# Patient Record
Sex: OTHER/UNKNOWN | Race: WHITE | Employment: UNEMPLOYED | URBAN - METROPOLITAN AREA
[De-identification: names, ages, dates, MRNs, and addresses within clinical notes are randomized per-mention and may not be internally consistent; named-entity substitution may affect disease eponyms.]

---

## 2023-03-07 ENCOUNTER — LAB ENCOUNTER (OUTPATIENT)
Dept: LAB | Age: 58
End: 2023-03-07
Attending: INTERNAL MEDICINE
Payer: COMMERCIAL

## 2023-03-07 DIAGNOSIS — E78.00 PURE HYPERCHOLESTEROLEMIA: Primary | ICD-10-CM

## 2023-03-07 LAB
CHOLEST SERPL-MCNC: 158 MG/DL (ref ?–200)
FASTING PATIENT LIPID ANSWER: YES
HDLC SERPL-MCNC: 56 MG/DL (ref 40–59)
LDLC SERPL CALC-MCNC: 83 MG/DL (ref ?–100)
NONHDLC SERPL-MCNC: 102 MG/DL (ref ?–130)
TRIGL SERPL-MCNC: 102 MG/DL (ref 30–149)
VLDLC SERPL CALC-MCNC: 16 MG/DL (ref 0–30)

## 2023-03-07 PROCEDURE — 80061 LIPID PANEL: CPT

## 2023-03-07 PROCEDURE — 36415 COLL VENOUS BLD VENIPUNCTURE: CPT

## 2024-02-27 ENCOUNTER — LAB ENCOUNTER (OUTPATIENT)
Dept: LAB | Age: 59
End: 2024-02-27
Attending: INTERNAL MEDICINE
Payer: COMMERCIAL

## 2024-02-27 DIAGNOSIS — E78.00 HIGH CHOLESTEROL: Primary | ICD-10-CM

## 2024-02-27 LAB
CHOLEST SERPL-MCNC: 158 MG/DL (ref ?–200)
FASTING PATIENT LIPID ANSWER: YES
HDLC SERPL-MCNC: 45 MG/DL (ref 40–59)
LDLC SERPL CALC-MCNC: 92 MG/DL (ref ?–100)
NONHDLC SERPL-MCNC: 113 MG/DL (ref ?–130)
TRIGL SERPL-MCNC: 115 MG/DL (ref 30–149)
VLDLC SERPL CALC-MCNC: 19 MG/DL (ref 0–30)

## 2024-02-27 PROCEDURE — 80061 LIPID PANEL: CPT

## 2024-02-27 PROCEDURE — 36415 COLL VENOUS BLD VENIPUNCTURE: CPT

## 2025-05-08 ENCOUNTER — LAB ENCOUNTER (OUTPATIENT)
Dept: LAB | Age: 60
End: 2025-05-08
Attending: INTERNAL MEDICINE
Payer: COMMERCIAL

## 2025-05-08 DIAGNOSIS — R94.39 ABNORMAL BALLISTOCARDIOGRAM: Primary | ICD-10-CM

## 2025-05-08 DIAGNOSIS — E78.00 PURE HYPERCHOLESTEROLEMIA: ICD-10-CM

## 2025-05-08 DIAGNOSIS — Z95.5 STENTED CORONARY ARTERY: ICD-10-CM

## 2025-05-08 LAB
ANION GAP SERPL CALC-SCNC: 8 MMOL/L (ref 0–18)
BASOPHILS # BLD AUTO: 0.05 X10(3) UL (ref 0–0.2)
BASOPHILS NFR BLD AUTO: 1.2 %
BUN BLD-MCNC: 14 MG/DL (ref 9–23)
CALCIUM BLD-MCNC: 9.6 MG/DL (ref 8.7–10.6)
CHLORIDE SERPL-SCNC: 111 MMOL/L (ref 98–112)
CO2 SERPL-SCNC: 26 MMOL/L (ref 21–32)
CREAT BLD-MCNC: 1.17 MG/DL (ref 0.7–1.3)
EGFRCR SERPLBLD CKD-EPI 2021: 72 ML/MIN/1.73M2 (ref 60–?)
EOSINOPHIL # BLD AUTO: 0.07 X10(3) UL (ref 0–0.7)
EOSINOPHIL NFR BLD AUTO: 1.6 %
ERYTHROCYTE [DISTWIDTH] IN BLOOD BY AUTOMATED COUNT: 12.9 %
FASTING STATUS PATIENT QL REPORTED: YES
GLUCOSE BLD-MCNC: 80 MG/DL (ref 70–99)
HCT VFR BLD AUTO: 41.8 % (ref 39–53)
HGB BLD-MCNC: 13.9 G/DL (ref 13–17.5)
IMM GRANULOCYTES # BLD AUTO: 0.01 X10(3) UL (ref 0–1)
IMM GRANULOCYTES NFR BLD: 0.2 %
LYMPHOCYTES # BLD AUTO: 1.68 X10(3) UL (ref 1–4)
LYMPHOCYTES NFR BLD AUTO: 39.5 %
MCH RBC QN AUTO: 30.2 PG (ref 26–34)
MCHC RBC AUTO-ENTMCNC: 33.3 G/DL (ref 31–37)
MCV RBC AUTO: 90.7 FL (ref 80–100)
MONOCYTES # BLD AUTO: 0.49 X10(3) UL (ref 0.1–1)
MONOCYTES NFR BLD AUTO: 11.5 %
NEUTROPHILS # BLD AUTO: 1.95 X10 (3) UL (ref 1.5–7.7)
NEUTROPHILS # BLD AUTO: 1.95 X10(3) UL (ref 1.5–7.7)
NEUTROPHILS NFR BLD AUTO: 46 %
OSMOLALITY SERPL CALC.SUM OF ELEC: 299 MOSM/KG (ref 275–295)
PLATELET # BLD AUTO: 232 10(3)UL (ref 150–450)
POTASSIUM SERPL-SCNC: 4.5 MMOL/L (ref 3.5–5.1)
RBC # BLD AUTO: 4.61 X10(6)UL (ref 4.3–5.7)
SODIUM SERPL-SCNC: 145 MMOL/L (ref 136–145)
WBC # BLD AUTO: 4.3 X10(3) UL (ref 4–11)

## 2025-05-08 PROCEDURE — 36415 COLL VENOUS BLD VENIPUNCTURE: CPT

## 2025-05-08 PROCEDURE — 80048 BASIC METABOLIC PNL TOTAL CA: CPT

## 2025-05-08 PROCEDURE — 85025 COMPLETE CBC W/AUTO DIFF WBC: CPT

## 2025-05-13 VITALS — BODY MASS INDEX: 31.1 KG/M2 | WEIGHT: 210 LBS | HEIGHT: 69 IN

## 2025-05-13 RX ORDER — ASPIRIN 81 MG/1
81 TABLET ORAL DAILY
COMMUNITY

## 2025-05-13 RX ORDER — EZETIMIBE 10 MG/1
10 TABLET ORAL NIGHTLY
COMMUNITY

## 2025-05-13 RX ORDER — ATORVASTATIN CALCIUM 20 MG/1
20 TABLET, FILM COATED ORAL NIGHTLY
COMMUNITY

## 2025-05-13 NOTE — PAT NURSING NOTE
PreOp Instructions     You are scheduled for: a Cardiac Procedure     Date of Procedure: 05/15/25     Diet Instructions: Do not eat or drink anything after midnight including gum, mints, candy, etc.     Medications: Take Aspirin 81 mg x 4 tablets the morning of your procedure. Medications you are allowed to take can be taken with a sip of water the morning of your procedure.     Medications to Stop: Hold any herbal supplements and vitamins the morning of your procedure.     Skin Prep : Shower with antibacterial soap using a clean washcloth, prior to procedure. Once dried off, no lotions/powders/creams/ointments, etc., Do not shave the procedure area, this will be completed at the hospital during the preparation phase.     Arrival Time: The day prior to your procedure you will receive a phone call between 3:00 pm - 6:00 pm with your arrival time. If you haven't received a phone call, please check your voicemail messages., If you did not receive a voice mail and it is after 6:00 pm, please call the nursing supervisor at [phone].    Driving After Procedure: Sedation will be given so you WILL NOT be able to drive home. You will need a responsible adult  to drive you home. You can NOT take uber or taxi unless approved by your physician in advance.     Discharge Teaching: Your nurse will give you specific instructions before discharge, Most people can resume normal activities in 2-3 days, Any questions, please call the physician's office

## 2025-05-14 NOTE — H&P
Addendum    Nuclear imaging suggested LAD ischemia    Referred for diagnostic cath    Reviewed procedure - R/B/A - including no intervention - appears to have a good understanding    14/1.2

## 2025-05-15 ENCOUNTER — HOSPITAL ENCOUNTER (OUTPATIENT)
Dept: INTERVENTIONAL RADIOLOGY/VASCULAR | Facility: HOSPITAL | Age: 60
Discharge: HOME | End: 2025-05-15
Attending: INTERNAL MEDICINE
Payer: COMMERCIAL

## 2025-05-22 NOTE — PAT NURSING NOTE
PreOp Instructions     You are scheduled for: a Cardiac Procedure     Date of Procedure: 05/23/25     Diet Instructions: Do not eat or drink anything after midnight including gum, mints, candy, etc.     Medications: Take Aspirin 81 mg x 4 tablets the morning of your procedure, Medications you are allowed to take can be taken with a sip of water the morning of your procedure.     Medications to Stop: Do not take any herbal supplements and vitamins the morning of your procedure.     Skin Prep : Shower with antibacterial soap using a clean washcloth, prior to procedure. Once dried off, no lotions/powders/creams/ointments, etc., Do not shave the procedure area, this will be completed at the hospital during the preparation phase.     Arrival Time: You will receive a phone call this afternoon, between 3:00 pm - 6:00 pm with your arrival time. If you haven't received a phone call, please check your voicemail messages., If you did not receive a voice mail and it is after 6:00 pm, please call the nursing supervisor at [phone].    Driving After Procedure: Sedation will be given so you WILL NOT be able to drive home. You will need a responsible adult  to drive you home. You can NOT take uber or taxi unless approved by your physician in advance.     Discharge Teaching: Your nurse will give you specific instructions before discharge, Most people can resume normal activities in 2-3 days, Any questions, please call the physician's office

## 2025-05-23 ENCOUNTER — HOSPITAL ENCOUNTER (OUTPATIENT)
Dept: INTERVENTIONAL RADIOLOGY/VASCULAR | Facility: HOSPITAL | Age: 60
Discharge: HOME | End: 2025-05-23
Attending: INTERNAL MEDICINE | Admitting: INTERNAL MEDICINE
Payer: COMMERCIAL

## 2025-05-23 VITALS
HEART RATE: 58 BPM | DIASTOLIC BLOOD PRESSURE: 76 MMHG | TEMPERATURE: 98 F | RESPIRATION RATE: 15 BRPM | BODY MASS INDEX: 31 KG/M2 | OXYGEN SATURATION: 98 % | WEIGHT: 210 LBS | SYSTOLIC BLOOD PRESSURE: 108 MMHG

## 2025-05-23 DIAGNOSIS — R94.39 ABNORMAL STRESS TEST: ICD-10-CM

## 2025-05-23 DIAGNOSIS — I25.10 CAD (CORONARY ARTERY DISEASE): ICD-10-CM

## 2025-05-23 PROCEDURE — 99153 MOD SED SAME PHYS/QHP EA: CPT | Performed by: INTERNAL MEDICINE

## 2025-05-23 PROCEDURE — 93567 NJX CAR CTH SPRVLV AORTGRPHY: CPT | Performed by: INTERNAL MEDICINE

## 2025-05-23 PROCEDURE — 93458 L HRT ARTERY/VENTRICLE ANGIO: CPT | Performed by: INTERNAL MEDICINE

## 2025-05-23 PROCEDURE — 99152 MOD SED SAME PHYS/QHP 5/>YRS: CPT | Performed by: INTERNAL MEDICINE

## 2025-05-23 RX ORDER — ACETAMINOPHEN 325 MG/1
650 TABLET ORAL EVERY 4 HOURS PRN
Status: CANCELLED | OUTPATIENT
Start: 2025-05-23

## 2025-05-23 RX ORDER — TRAMADOL HYDROCHLORIDE 50 MG/1
100 TABLET ORAL EVERY 6 HOURS PRN
Status: CANCELLED | OUTPATIENT
Start: 2025-05-23

## 2025-05-23 RX ORDER — LIDOCAINE HYDROCHLORIDE 10 MG/ML
INJECTION, SOLUTION EPIDURAL; INFILTRATION; INTRACAUDAL; PERINEURAL
Status: COMPLETED
Start: 2025-05-23 | End: 2025-05-23

## 2025-05-23 RX ORDER — HEPARIN SODIUM 5000 [USP'U]/ML
INJECTION, SOLUTION INTRAVENOUS; SUBCUTANEOUS
Status: COMPLETED
Start: 2025-05-23 | End: 2025-05-23

## 2025-05-23 RX ORDER — TRAMADOL HYDROCHLORIDE 50 MG/1
50 TABLET ORAL EVERY 6 HOURS PRN
Status: CANCELLED | OUTPATIENT
Start: 2025-05-23

## 2025-05-23 RX ORDER — VERAPAMIL HYDROCHLORIDE 2.5 MG/ML
INJECTION INTRAVENOUS
Status: COMPLETED
Start: 2025-05-23 | End: 2025-05-23

## 2025-05-23 RX ORDER — NITROGLYCERIN 20 MG/100ML
INJECTION INTRAVENOUS
Status: COMPLETED
Start: 2025-05-23 | End: 2025-05-23

## 2025-05-23 RX ORDER — BIVALIRUDIN 250 MG/5ML
INJECTION, POWDER, LYOPHILIZED, FOR SOLUTION INTRAVENOUS
Status: DISCONTINUED
Start: 2025-05-23 | End: 2025-05-23 | Stop reason: WASHOUT

## 2025-05-23 RX ORDER — SODIUM CHLORIDE 9 MG/ML
INJECTION, SOLUTION INTRAVENOUS CONTINUOUS
Status: CANCELLED | OUTPATIENT
Start: 2025-05-23 | End: 2025-05-23

## 2025-05-23 RX ORDER — MIDAZOLAM HYDROCHLORIDE 1 MG/ML
INJECTION INTRAMUSCULAR; INTRAVENOUS
Status: COMPLETED
Start: 2025-05-23 | End: 2025-05-23

## 2025-05-23 RX ORDER — SODIUM CHLORIDE 9 MG/ML
INJECTION, SOLUTION INTRAVENOUS
Status: COMPLETED | OUTPATIENT
Start: 2025-05-24 | End: 2025-05-23

## 2025-05-23 RX ADMIN — SODIUM CHLORIDE: 9 INJECTION, SOLUTION INTRAVENOUS at 08:41:00

## 2025-05-23 NOTE — PROCEDURES
[facility]    PATIENT'S NAME: JUAN JOSE RAYGOZA   ATTENDING PHYSICIAN: Lamont Gandhi M.D.   OPERATING PHYSICIAN: Lamont Gandhi M.D.   PATIENT ACCOUNT#:   [id number]    LOCATION:  [address]  MEDICAL RECORD #:   [id number]       YOB: 1965  ADMISSION DATE:       05/23/2025      OPERATION DATE:  05/23/2025    CARDIAC PROCEDURE TRANSCRIPTION    CARDIAC CATHETERIZATION    PREOPERATIVE DIAGNOSIS:    POSTOPERATIVE DIAGNOSIS:    PROCEDURE PERFORMED:    1.   Left heart catheterization:  Left ventriculography and selective coronary arteriography.  2.   Supravalvular aortography.    HISTORY:  The patient is a 59-year-old gentleman with known coronary disease.  He is referred for diagnostic cardiac catheterization due to an abnormal nuclear study suggesting anterior wall ischemia.    DESCRIPTION OF PROCEDURE:  After obtaining informed consent, the patient was brought to the cardiac catheterization laboratory, and using ultrasound guidance and a micropuncture technique, a 5/6 Slender sheath was placed in the right radial artery.  I was able to place the radial wire easily into the ascending aorta and performed the initial right coronary angiography with a standard diagnostic catheter.  As I went to place the left coronary catheter, however, there was resistance to passage of the catheter beyond the mid upper arm.  Angiography revealed a very small and nearly obliterated vessel at that juncture.  I, therefore, had to switch to the femoral approach for the remainder of the case.  Access was achieved in the femoral using ultrasound guidance and a micropuncture technique.    HEMODYNAMICS:  The left ventricular end-diastolic pressure was 10 mmHg.    Using a dual-lumen pigtail catheter, mean gradient across the aortic valve was 20 mmHg.    LEFT VENTRICULOGRAPHY:  Ventriculography was performed in the 30-degree PARNELL projection.  There was a small focal area of inferoapical akinesia with overall  well-preserved left ventricular systolic function.  The estimated left ventricular ejection fraction was 60% to 65%.  No significant mitral insufficiency was noted.    Supravalvular aortography demonstrated calcification of the sinotubular junction.  There was mild generalized aneurysmal dilatation of the ascending aorta, and the patient had a clear-cut bicuspid valve with marked eccentricity of valve opening.  The leaflets were calcified, and there was restriction in overall valvular leaflet mobility.  Mild to moderate aortic insufficiency was present.    CORONARY ARTERIOGRAPHY:  The coronary circulation was left dominant, and a moderate amount of vascular calcification was seen fluoroscopically.    The left main coronary artery had no angiographic evidence of significant obstructive atherosclerosis.    The left anterior descending coronary artery was occluded in its midportion.  The distal vessel filled via left-to-left and right-to-left collaterals.    The left circumflex coronary artery was a large dominant vessel with no angiographic evidence of significant obstructive atherosclerosis.  There was mild angiographically nonobstructive atherosclerosis of the first major obtuse marginal vessel.    The right coronary artery had previously been stented.  This was a small nondominant vessel.  The stents were widely patent with only minimal intimal thickening in the midvessel.    SUMMARY:  In summary, the patient is a 59-year-old gentleman who on cardiac catheterization today was demonstrated to have an occluded LAD with preserved overall left ventricular systolic function.  He has a bicuspid aortic valve with mild aortic stenosis and mild to moderate aortic insufficiency.  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    At this point, the patient will be treated medically.  I will review his films with my interventional cardiology colleagues, and we will  make a decision regarding the possibility of approaching the LAD .  There is a very ambiguous proximal cap and it is a long occlusion.    I personally supervised the intravenous administration of conscious sedation throughout this case in the form of Versed and fentanyl.  Patient was under continuous hemodynamic, electrocardiographic, and respiratory monitoring.  Sedation time was 10:35 to 11:55.  This is a total of 80 minutes.    Dictated By Lamont Gandhi M.D.  d: 05/23/2025 12:23:49  t: 05/23/2025 13:03:35  Job [id number]  /

## 2025-05-23 NOTE — PROGRESS NOTES
Prelim angio    Normal LV function    Bicuspid aortic valve with marked eccentricity of valve opening    Mean gradient 20 mmHg    Mild AI    LM okay  LAD occluded mid with L-L and R-L collaterals  LCX okay - codominant  RCA previous stents patent    Radial artery small and nearly obliterated in upper arm - had to switch to femoral approach to complete case    Medical therapy for now    May consider PTCA of  in future

## 2025-05-23 NOTE — PROGRESS NOTES
Patient returned post angiogram. Right radial tr band in place right groin sealed with angioseal. Both access sights are soft ,dry and intact. VSS. Denies pain   Reviewed plan of care with patient and wife verbalized understanding. Mikaeli\ng without difficulty    Dr. Gandhi at bedside.     1300 air being removed from tr band.Continued to remove air from the tr band over the next hour.     1415 TR band removed and dressing placed. Right groin soft , dry and intact. Patient up to ambulate. Bilat sights soft, dry and intact.     Patient up to ambulate without difficulty VSS. IV discontinued AVS and discharge instructions given to wife and patient. Verbalized understanding. Patient discharged home via wheelchair.  .

## 2025-05-23 NOTE — DISCHARGE INSTRUCTIONS
HOME CARE INSTRUCTIONS FOLLOWING CORONARY ANGIOGRAPHY, PERIPHERAL ANGIOGRAPHY, ANGIOPLASTY (PTCA/PTA) OR INSERTION OF STENT IN THE CORONARY, CAROTID, AND/OR PERIPHERAL ARTERIES   Activity:    DO NOT drive after the procedure. You may resume driving late the following day according to the nurse or physician’s instructions    Plan on resting and relaxing tonight and tomorrow    Resume your normal activity after 48 hours, or as instructed by your physician    Do not lift anything over 10 pounds for the next 24 hours    Avoid sexual activity for the next 24 hours    Avoid drinking alcohol for the next 24 hours    If the groin site was used, avoid repeated stair use and excessive walking for the next 24 hours     What is Normal?    A small lump at the procedure site associated with mild tenderness when touched    The procedure site may be bruised or discolored    There may be a small amount of drainage on the bandage   Special Instructions:    Drink plenty of fluids during the next 24 hours to “flush” the contrast from your system    The bandage is to remain in place for 24 hours    Keep the bandage clean and dry    DO NOT submerge the procedure site for 72 hours (no bath tubs or pools)     After 24 hours, you must remove the bandage    You should shower after removing the bandage, and wash the procedure site gently with soap and water    If you choose to wear a bandage for a few days, make sure it remains clean and dry and that it is changed daily   When you should NOTIFY YOUR PHYSICIAN:    Bleeding can occur at the procedure site - both on the outside of the skin and/or beneath the surface of the skin    Swelling or a large lump at the procedure site can occur, which may be accompanied by moderate to severe pain   If either of the above occurs, lie down flat. Have someone apply pressure to the procedure site with both hands, as instructed by the nurse. Hold pressure for 20 minutes and the bleeding should stop. Notify  your physician of the occurrence   If the bleeding does not stop, call 911 and continue to apply pressure    If you experience signs of a fever, temperature >101o, chills, infection (redness, swelling, thick yellow drainage, or a foul odor from the procedure site)    If you notice any numbness, tingling, or loss of feeling to your leg or foot for groin access    If you notice any numbness, tingling, or loss of feeling to your fingers or hand, if wrist access was utilized   Continue the walking program initiated in the hospital and progress your walking as directed. Or, gradually resume your previous aerobic exercise schedule as tolerated.    Please dont make any major decisions for 24 hours after procedure.

## 2025-05-29 ENCOUNTER — HOSPITAL ENCOUNTER (INPATIENT)
Facility: HOSPITAL | Age: 60
LOS: 2 days | Discharge: HOME | DRG: 254 | End: 2025-05-31
Attending: HOSPITALIST | Admitting: HOSPITALIST
Payer: COMMERCIAL

## 2025-05-29 ENCOUNTER — APPOINTMENT (OUTPATIENT)
Dept: CT IMAGING | Facility: HOSPITAL | Age: 60
DRG: 254 | End: 2025-05-29
Attending: NURSE PRACTITIONER
Payer: COMMERCIAL

## 2025-05-29 ENCOUNTER — HOSPITAL ENCOUNTER (INPATIENT)
Facility: HOSPITAL | Age: 60
LOS: 2 days | Discharge: HOME | End: 2025-05-31
Attending: HOSPITALIST | Admitting: HOSPITALIST
Payer: COMMERCIAL

## 2025-05-29 ENCOUNTER — APPOINTMENT (OUTPATIENT)
Dept: CT IMAGING | Facility: HOSPITAL | Age: 60
End: 2025-05-29
Attending: NURSE PRACTITIONER
Payer: COMMERCIAL

## 2025-05-29 ENCOUNTER — ANESTHESIA EVENT (OUTPATIENT)
Dept: CARDIAC SURGERY | Facility: HOSPITAL | Age: 60
End: 2025-05-29
Payer: COMMERCIAL

## 2025-05-29 DIAGNOSIS — I72.4 FEMORAL ARTERY ANEURYSM, RIGHT: Primary | ICD-10-CM

## 2025-05-29 PROBLEM — I72.9 PSEUDOANEURYSM: Status: ACTIVE | Noted: 2025-05-29

## 2025-05-29 LAB
ANION GAP SERPL CALC-SCNC: 8 MMOL/L (ref 0–18)
ANTIBODY SCREEN: NEGATIVE
BASOPHILS # BLD AUTO: 0.04 X10(3) UL (ref 0–0.2)
BASOPHILS NFR BLD AUTO: 0.6 %
BUN BLD-MCNC: 14 MG/DL (ref 9–23)
CALCIUM BLD-MCNC: 9.8 MG/DL (ref 8.7–10.6)
CHLORIDE SERPL-SCNC: 105 MMOL/L (ref 98–112)
CO2 SERPL-SCNC: 26 MMOL/L (ref 21–32)
CREAT BLD-MCNC: 1.07 MG/DL (ref 0.7–1.3)
EGFRCR SERPLBLD CKD-EPI 2021: 80 ML/MIN/1.73M2 (ref 60–?)
EOSINOPHIL # BLD AUTO: 0.12 X10(3) UL (ref 0–0.7)
EOSINOPHIL NFR BLD AUTO: 1.9 %
ERYTHROCYTE [DISTWIDTH] IN BLOOD BY AUTOMATED COUNT: 12.7 %
GLUCOSE BLD-MCNC: 93 MG/DL (ref 70–99)
HCT VFR BLD AUTO: 37.5 % (ref 39–53)
HGB BLD-MCNC: 12.8 G/DL (ref 13–17.5)
IMM GRANULOCYTES # BLD AUTO: 0.02 X10(3) UL (ref 0–1)
IMM GRANULOCYTES NFR BLD: 0.3 %
LYMPHOCYTES # BLD AUTO: 1.83 X10(3) UL (ref 1–4)
LYMPHOCYTES NFR BLD AUTO: 28.9 %
MCH RBC QN AUTO: 30.3 PG (ref 26–34)
MCHC RBC AUTO-ENTMCNC: 34.1 G/DL (ref 31–37)
MCV RBC AUTO: 88.9 FL (ref 80–100)
MONOCYTES # BLD AUTO: 0.6 X10(3) UL (ref 0.1–1)
MONOCYTES NFR BLD AUTO: 9.5 %
NEUTROPHILS # BLD AUTO: 3.72 X10 (3) UL (ref 1.5–7.7)
NEUTROPHILS # BLD AUTO: 3.72 X10(3) UL (ref 1.5–7.7)
NEUTROPHILS NFR BLD AUTO: 58.8 %
OSMOLALITY SERPL CALC.SUM OF ELEC: 288 MOSM/KG (ref 275–295)
PLATELET # BLD AUTO: 244 10(3)UL (ref 150–450)
POTASSIUM SERPL-SCNC: 4 MMOL/L (ref 3.5–5.1)
RBC # BLD AUTO: 4.22 X10(6)UL (ref 4.3–5.7)
RH BLOOD TYPE: NEGATIVE
SODIUM SERPL-SCNC: 139 MMOL/L (ref 136–145)
WBC # BLD AUTO: 6.3 X10(3) UL (ref 4–11)

## 2025-05-29 PROCEDURE — 99223 1ST HOSP IP/OBS HIGH 75: CPT | Performed by: HOSPITALIST

## 2025-05-29 PROCEDURE — 75635 CT ANGIO ABDOMINAL ARTERIES: CPT | Performed by: NURSE PRACTITIONER

## 2025-05-29 RX ORDER — POLYETHYLENE GLYCOL 3350 17 G/17G
17 POWDER, FOR SOLUTION ORAL DAILY PRN
Status: DISCONTINUED | OUTPATIENT
Start: 2025-05-29 | End: 2025-05-31

## 2025-05-29 RX ORDER — ONDANSETRON 2 MG/ML
4 INJECTION INTRAMUSCULAR; INTRAVENOUS EVERY 6 HOURS PRN
Status: DISCONTINUED | OUTPATIENT
Start: 2025-05-29 | End: 2025-05-30

## 2025-05-29 RX ORDER — ACETAMINOPHEN 500 MG
500 TABLET ORAL EVERY 4 HOURS PRN
Status: DISCONTINUED | OUTPATIENT
Start: 2025-05-29 | End: 2025-05-31

## 2025-05-29 RX ORDER — SODIUM PHOSPHATE, DIBASIC AND SODIUM PHOSPHATE, MONOBASIC 7; 19 G/230ML; G/230ML
1 ENEMA RECTAL ONCE AS NEEDED
Status: DISCONTINUED | OUTPATIENT
Start: 2025-05-29 | End: 2025-05-31

## 2025-05-29 RX ORDER — ATORVASTATIN CALCIUM 20 MG/1
20 TABLET, FILM COATED ORAL NIGHTLY
Status: DISCONTINUED | OUTPATIENT
Start: 2025-05-29 | End: 2025-05-30

## 2025-05-29 RX ORDER — SENNOSIDES 8.6 MG
17.2 TABLET ORAL NIGHTLY PRN
Status: DISCONTINUED | OUTPATIENT
Start: 2025-05-29 | End: 2025-05-31

## 2025-05-29 RX ORDER — PROCHLORPERAZINE EDISYLATE 5 MG/ML
5 INJECTION INTRAMUSCULAR; INTRAVENOUS EVERY 8 HOURS PRN
Status: DISCONTINUED | OUTPATIENT
Start: 2025-05-29 | End: 2025-05-31

## 2025-05-29 RX ORDER — EZETIMIBE 10 MG/1
10 TABLET ORAL NIGHTLY
Status: DISCONTINUED | OUTPATIENT
Start: 2025-05-30 | End: 2025-05-31

## 2025-05-29 RX ORDER — BISACODYL 10 MG
10 SUPPOSITORY, RECTAL RECTAL
Status: DISCONTINUED | OUTPATIENT
Start: 2025-05-29 | End: 2025-05-31

## 2025-05-29 NOTE — HISTORICAL OFFICE NOTE
Facility Logo [facility]  [address] [place] [address], [address] [place] [address]  [phone]    Document Amended  Luigi Mckinney  Progress Note  Demographics:  Name: Luigi Mckinney YOB: 1965  Age: 59, Male Medical Record No: [id number]  Visited Date/Time: 05/28/2025 02:30 PM    Chief Complaints  LHC - 5/23   History of Present Illness  Mr. Mckinney is a pleasant 59 year old male with history of CAD/MI s/p PCI to RCA, Bicuspid AV with mild to moderate AI, HL who presents for hospital follow up.    Patient underwent LHC on 5/23/25 with Dr. Gandhi for abnormal stress test. LHC demonstrated occluded mid LAD and patent stent in the RCA. Bicuspid AC with mild AS and mild to moderate AI.    He remains active. No chest pain, shortness of breath, PND, orthopnea, or edema. No palpitations, dizziness, presyncope, or syncope. He has noted tenderness at procedure site and swelling.    He has been taking TRT gel for low testosterone levels with notable difference in energy.    Labs 4/18/25:     PET MPI 4/18/25:   This is an abnormal perfusion study.    Medium sized reversible anteroapical perfusion defect of moderate severity.   The left ventricular cavity is noted to be mildly enlarged on the stress studies. The stress left ventricular ejection fraction was calculated to be 64%. There is stress-induced anteroapical hypokinesia. The rest left ventricular cavity is noted to be normal. The rest left ventricular ejection fraction was calculated to be 59% and rest left ventricular global function is normal.     Echo 2/11/25:  1. The left ventricle is normal in size and global systolic function. Left ventricular diastolic function is normal. Left ventricular ejection fraction is 60-65%.  2. The aortic valve appears functionally bicuspid with mild aortic stenosis. The trans-aortic peak velocity is 2.6 m/s. The trans-aortic mean gradient is 15.0 mmHg. Trace AI.  3. Aortic root diameter is 4.1 cm.  Ascending  aorta diameter is 4.0 cm.  Cardiac risk factors Dyslipidemia and Never smoked  Past Medical History  1.Hematoma of right inguinal region  2.Ascending aorta dilatation  3.High cholesterol  4.s/p drug-eluting stent in the coronary bypass graft - DESC  Family History  1. Mother - High blood cholesterol  Social History  Smoking status Never smoked  Tobacco usage - No (Non-smoker for personal reasons (finding))  Alcohol usage - Yes (casually)  Review of systems  Cardiovascular Edema  No history of Chest pain, AQUINO, Palpitations, Syncope, PND, Orthopnea and Claudication  Respiratory No history of SOB  Physical Examination  Vitals Right Arm Sitting  / 68 mmHg, Pulse rate 69 bpm, Regular, Height in 5' 9\", BMI: 30.3, Weight in 205 lbs (or) 92.99 kgs and BSA : 2.15 cc/m²  General Appearance No Acute Distress  Neck No carotid bruits and No JVD  Respiratory Unlabored and Lungs clear with normal breath sounds  Cardiovascular 2/6 diastolic murmur and Regular rhythm. Normal rate present.    Lower Extremities No edema  Skin RFA site is C/D/I and no bruit. Moderate sized hematoma.  Allergies  No known medication allergies.  Medications (Info obtained by: Verbal)  1.aspirin 81 mg chewable tablet, Take 1 tablet orally once a day.  2.atorvastatin 20 mg tablet, Take 1 tablet orally once a day.  3.Zetia 10 mg tablet, Take 1 tablet orally once a day.  Impression  1.Hematoma of right inguinal region  2.Abnormal stress test  3.Ascending aorta dilatation  4.Aortic valve, bicuspid  5.Hx of heart artery stent  6.NSTEMI - non-ST elevated myocardial infarction, acute, non-Q wave or nontransmural  7.High cholesterol  Assessment & Plan  Assessment:  1. CAD/MI s/p PCI to RCA: Most recent C with occluded mid LAD and patent RCA. Patient denies chest pain or shortness of breath. We discussed aggressive medical management. Will increase atorvastatin to 40 mg qhs and continue Zetia 10 mg daily. If he does not tolerate higher intensity statin,  would recommend PCSK9 inhibitor. Repeat fasting labs in 2 months. He has a moderate sized hematoma with tenderness. Will plan for RLE arterial dopplers to rule out PSA today. Will review with Dr. Gandhi.  2. Bicuspid AV with mild AS and mild to moderate AI: Stable. Continue to monitor.      Plan:  1. RLE arterial dopplers to rule out pseudoaneurysm today  2. Increase Atorvastatin 40 mg qhs  3. Fasting labs in 2 months: FLP, AST, ALT, CK  5. Follow up with Dr. Gandhi as scheduled on 25      Increased BMI: Provide patient with information regarding diet and lifestyle changes.  Medications Ordered  1.atorvastatin 40 mg tablet, Take 1 tablet orally once a day at night.  Labs and Diagnostics ordered  1.Arterial Lower Ultrasound Ext (Right) ([facility]) (Today)  2.Lipid Panel_Fasting (2 Months)  3.AST / ALT (2 Months)  4.CK (Creatine kinase) (2 Months)  Miscellaneous  1.Weight monitoring (regime/therapy)  Nurses documentation  Upcoming surgeries: none  Use of assistive devices(s): none  Refills completed: none  EKG: No   Triage & medication list reviewed by: DANIEL     Patient instructions  Medications:   1. Increase Atorvastatin 40 mg every night at bedtime   2.Continue current medications.    Testin. Right Lower Extremity dopplers, today  2.  Fasting labs in 2 months: FLP, AST, ALT, CK    Provider Follow Up:  1. Follow up with Dr. Gandhi as scheduled on 25    Please bring in you medication bottles or updated medicine list to your next appointment.  Call [facility] if you have any problems or concerns at [phone]      Lab Details  BASIC METABOLIC PANEL (8)  2025 06:39:34 PM  GLUCOSE 80 70-99 mg/dL  F  SODIUM 145 136-145 mmol/L  F  POTASSIUM 4.5 3.5-5.1 mmol/L  F  CHLORIDE 111  mmol/L  F  CO2 26.0 21.0-32.0 mmol/L  F  ANION GAP 8 0-18 mmol/L  F  BUN 14 9-23 mg/dL  F  CREATININE 1.17 0.70-1.30 mg/dL  F  CALCIUM 9.6 8.7-10.6 mg/dL  F  OSMOLALITY CALCULATED 299 275-295 mOsm/kg H F  E GFR CR 72 >=60  mL/min/1.73m2  F  FASTING PATIENT BMP ANSWER Yes   F  CBC WITH DIFFERENTIAL WITH PLATELET  05/08/2025 02:44:15 PM  WBC 4.3 4.0-11.0 x10(3) uL  F  RED BLOOD COUNT 4.61 4.30-5.70 x10(6)uL  F  HGB 13.9 13.0-17.5 g/dL  F  HCT 41.8 39.0-53.0 %  F  PLATELETS 232.0 150.0-450.0 10(3)uL  F  MEAN CELL VOLUME 90.7 80.0-100.0 fL  F  MEAN CORPUSCULAR HEMOGLOBIN 30.2 26.0-34.0 pg  F  MEAN CORPUSCULAR HGB CONC 33.3 31.0-37.0 g/dL  F  RED CELL DISTRIBUTION WIDTH CV 12.9 %  F  NEUTROPHIL ABS PRELIM 1.95 1.50-7.70 x10 (3) uL  F  NEUTROPHIL ABSOLUTE 1.95 1.50-7.70 x10(3) uL  F  LYMPHOCYTE ABSOLUTE 1.68 1.00-4.00 x10(3) uL  F  MONOCYTE ABSOLUTE 0.49 0.10-1.00 x10(3) uL  F  EOSINOPHIL ABSOLUTE 0.07 0.00-0.70 x10(3) uL  F  BASOPHIL ABSOLUTE 0.05 0.00-0.20 x10(3) uL  F  IMMATURE GRANULOCYTE COUNT 0.01 0.00-1.00 x10(3) uL  F  NEUTROPHIL % 46.0 %  F  LYMPHOCYTE % 39.5 %  F  MONOCYTE % 11.5 %  F  EOSINOPHIL % 1.6 %  F  BASOPHIL % 1.2 %  F  IMMATURE GRANULOCYTE RATIO % 0.2 %  F  LIPID PANEL  04/18/2025 08:52:00 AM  CHOLESTEROL 167 0 - 199 MG/DL  F  TRIGLYCERIDES 112 10 - 250 MG/DL  F  HDL CHOLESTEROL 53 30 - 85 MG/DL  F  VLDL 22 5 - 40 MG/DL  F  TOTAL CHOL/HDL 3.2 0.0 - 5.0 MG/DL  F  Non- 0 - 129 mg/dL  F  LDL CHOLESTEROL DIRECT 108 0 - 130 MG/DL  F  Diagnostics Details  Lower Extremity Arterial Ultrasound 05/28/2025  1.The study quality is good.    2.History of cardiac catherization, 5/23/2025 with right groin access.    3.Pseudoaneurysm visualized in the right groin measuring 2.5 x 2.2 x 2.4 cm with a neck connection measuring 0.57 cm to the distal right common femoral artery.    4.No evidence of AV fistula in the right groin.    5.No evidence of deep venous thrombosis of the right distal external iliac, common femoral, profunda femoral and proximal superficial femoral veins with normal venous blood flow patterns.    6.Dr. Fang notified.    Nuclear PET 04/18/2025  1.Stress EKG is normal.    1.This is an abnormal perfusion  study.    2.Medium sized reversible anteroapical perfusion defect of moderate severity.    3.The left ventricular cavity is noted to be mildly enlarged on the stress studies. The stress left ventricular ejection fraction was calculated to be 64%. There is stress-induced anteroapical hypokinesia. The rest left ventricular cavity is noted to be normal. The rest left ventricular ejection fraction was calculated to be 59% and rest left ventricular global function is normal.    Trans Thoracic Echocardiogram 02/11/2025  1.The left ventricle is normal in size and global systolic function. Left ventricular diastolic function is normal. Left ventricular ejection fraction is 60-65%.    2.The aortic valve appears functionally bicuspid with mild aortic stenosis. The trans-aortic peak velocity is 2.6 m/s. The trans-aortic mean gradient is 15.0 mmHg. Trace AI.    3.Aortic root diameter is 4.1 cm. Ascending aorta diameter is 4.0 cm.    Exercise Treadmill Testing 12/07/2022  1.Stress EKG is normal.    CPOE Orders carried out by: Cassandra RAMOS and Bushra Martin  Care Providers: Josh Fang MD, Jaycee Fuentes CMA, Cassandra RAMOS and Bushra Martin  Electronically Authenticated by  Cassandra RAMOS  05/28/2025 11:57:51 PM  Amendment Request : 1  Discussed with Dr. Fang and Dr. Gandhi. RLE arterial dopplers shows 2.5 cm PSA. Patient was seen by Dr. Gandhi. IR consultation recommended for thrombin injection. IR notified at [facility] however requesting CD to review US from [facility]. Will need to send tomorrow morning as medical records is not available today.  Reviewed plan of care with patient and Dr. Gandhi. Recommend IR consultation and possible thrombin injection tomorrow. IR holding 1pm time frame. Patient advised to go to the ED for expedited evaluation if worsening symptoms including increasing swelling or pain. Patient verbalized understanding.    Requested By Initiated By Accepted By  Cassandra Trujillo  Lacie RAMOS  05/29/2025 12:05:24 AM 05/29/2025 12:05:24 AM 05/29/2025 12:05:24 AM  Amendment Request : 2  Reviewed with IR, not suitable for thrombin injection. D/w Dr. Fang and Dr. Ashley, advised ED admission and possible surgery tomorrow. Left message for patient to contact our office.  Requested By Initiated By Accepted By  Cassandra RAMOS  05/29/2025 09:00:04 AM 05/29/2025 09:00:04 AM 05/29/2025 09:00:04 AM  Amendment Request : 3  Attempted to reach patient x 2. No answer. LMTCB.  Requested By Initiated By Accepted By  Cassandra RAMOS  05/29/2025 11:30:39 AM 05/29/2025 11:30:39 AM 05/29/2025 11:30:39 AM  Amendment Request : 4  Discussed with patient that pseudoaneursym is not suitable for thrombin injection. Surgery consult recommended and possible surgical repair tomorrow but Dr. Ashley would like CTA abd/pelvis to further evaluate inpatient. Patient denies pain or increase in swelling. We will work on direct admission or if no beds available, will need to go to the ED.   Reviewed with Dr. Gandhi who agrees. Will plan for direct admission for pseudoaneurysm and consult Dr. Ashley and hospitalist team. Inpatient ANUJA made aware. Chery calling bed board for availability.  Requested By Initiated By Accepted By  Cassandra RAMOS  05/29/2025 11:58:34 AM 05/29/2025 11:58:34 AM 05/29/2025 11:58:34 AM  Disclaimer: Components of this note were documented using voice recognition system and are subject to errors not corrected at proofreading. Contact the author of this note for any clarifications.

## 2025-05-29 NOTE — ANESTHESIA PREPROCEDURE EVALUATION
PRE-OP EVALUATION    Patient Name: Luigi Mckinney    Admit Diagnosis: Pseudoaneruysm    Pre-op Diagnosis: right femoral pseudoaneurysm    RIGHT FEMORAL ARTERY PSEUDOANEURYSM REPAIR    Anesthesia Procedure: RIGHT FEMORAL ARTERY PSEUDOANEURYSM REPAIR (Right)    Surgeons and Role:     * Varinder Ashley MD - Primary    Pre-op vitals reviewed.        There is no height or weight on file to calculate BMI.    Current medications reviewed.  Hospital Medications:  Current Medications[1]    Outpatient Medications:   Prescriptions Prior to Admission[2]    Allergies: Patient has no known allergies.      Anesthesia Evaluation    Patient summary reviewed.    Anesthetic Complications  (-) history of anesthetic complications         GI/Hepatic/Renal                                 Cardiovascular                     (+) hyperlipidemia  (+) CAD  (+) past MI  (+) CABG/stent    (+) valvular problems/murmurs and AI                       Endo/Other                                  Pulmonary                           Neuro/Psych                              CAD/MI s/p PCI to RCA, Bicuspid AV with mild to moderate AI    He has a moderate sized hematoma with tenderness        Past Surgical History[3]  Social Hx on file[4]  History   Drug Use Unknown     Available pre-op labs reviewed.  Lab Results   Component Value Date    WBC 4.3 05/08/2025    RBC 4.61 05/08/2025    HGB 13.9 05/08/2025    HCT 41.8 05/08/2025    MCV 90.7 05/08/2025    MCH 30.2 05/08/2025    MCHC 33.3 05/08/2025    RDW 12.9 05/08/2025    .0 05/08/2025     Lab Results   Component Value Date     05/08/2025    K 4.5 05/08/2025     05/08/2025    CO2 26.0 05/08/2025    BUN 14 05/08/2025    CREATSERUM 1.17 05/08/2025    GLU 80 05/08/2025    CA 9.6 05/08/2025             ASA: 3   Plan: general           Comment: This is a limited chart review prior to patient encounter in anticipation of anesthesia for a scheduled procedure.                    Present on  Admission:  **None**             [1]   No current facility-administered medications on file as of 5/29/2025.   [2]   Medications Prior to Admission   Medication Sig Dispense Refill Last Dose/Taking    aspirin 81 MG Oral Tab EC Take 1 tablet (81 mg total) by mouth daily.       atorvastatin 20 MG Oral Tab Take 1 tablet (20 mg total) by mouth nightly.       ezetimibe 10 MG Oral Tab Take 1 tablet (10 mg total) by mouth nightly.      [3]   Past Surgical History:  Procedure Laterality Date    Cath drug eluting stent      Cath percutaneous  transluminal coronary angioplasty     [4]   Social History  Socioeconomic History    Marital status:    Tobacco Use    Smoking status: Never    Smokeless tobacco: Never   Substance and Sexual Activity    Alcohol use: Yes     Comment: Socially    Drug use: Never

## 2025-05-29 NOTE — CONSULTS
[facility]  Consultation    Luigi Mckinney Patient Status:  Inpatient    1965 MRN [id number]   Location [facility] 7NE-A Attending Emory Dick MD   Hosp Day # 0 PCP SHARMAINE TOWNSEND     Consults    Reason for Consultation:  Right groin pseudoaneurysm    History of Present Illness:  Luigi Mckinney is a a(n) 59 year old male who had routine follow up following [facility] yesterday in [facility] office. Was found to have tenderness/swelling to right groin and so US was done. US demonstrated pseudoaneurysm. Plan was for thrombin injection, however, after discussion with IR, this was unable to be done. Therefore, the PA discussed with Dr. Ashley who recommend admission and surgical repair.     Patient is currently resting in bed, in no acute distress. Denies cp, sob. Has right groin tenderness and ecchymosis.    History:  Past Medical History[1]  Past Surgical History[2]  Family History[3]   reports that he has never smoked. He has never used smokeless tobacco. He reports current alcohol use. He reports that he does not use drugs.    Allergies:  Allergies[4]    Medications:  Current Hospital Medications[5]    Review of Systems:  Review of Systems   Skin:  Positive for color change.   All other systems reviewed and are negative.          OBJECTIVE  There were no vitals taken for this visit.  No data recorded.    Wt Readings from Last 3 Encounters:   25 210 lb (95.3 kg)   25 210 lb (95.3 kg)       Telemetry: nsr    Physical Exam:      Gen: alert, oriented x 3, NAD  Heent: pupils equal, reactive. Mucous membranes moist.   Neck: no jvd  Cardiac: regular rate and rhythm, normal S1,S2  Lungs: CTA  Abd: soft, NT/ND +bs  Ext: no edema. Right groin with ecchymosis, full, small palpable hematoma noted. Dp2+ bilaterally.  Skin: Warm, dry  Neuro: No focal deficits        Diagnostics History:    Echo: 25     1. The left ventricle is normal in size and global systolic function. Left ventricular diastolic  function is normal. Left ventricular ejection fraction is 60-65%.  2. The aortic valve appears functionally bicuspid with mild aortic stenosis. The trans-aortic peak velocity is 2.6 m/s. The trans-aortic mean gradient is 15.0 mmHg. Trace AI.  3. Aortic root diameter is 4.1 cm.  Ascending aorta diameter is 4.0 cm.    Cath: 5/23/25  SUMMARY:  In summary, the patient is a 59-year-old gentleman who on cardiac catheterization today was demonstrated to have an occluded LAD with preserved overall left ventricular systolic function.  He has a bicuspid aortic valve with mild aortic stenosis and mild to moderate aortic insufficiency.  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     At this point, the patient will be treated medically.  I will review his films with my interventional cardiology colleagues, and we will make a decision regarding the possibility of approaching the LAD .  There is a very ambiguous proximal cap and it is a long occlusion.            Results:   No results for input(s): \"GLU\", \"BUN\", \"CREATSERUM\", \"GFRAA\", \"GFRNAA\", \"EGFRCR\", \"CA\", \"NA\", \"K\", \"CL\", \"CO2\" in the last 168 hours.  No results for input(s): \"RBC\", \"HGB\", \"HCT\", \"MCV\", \"MCH\", \"MCHC\", \"RDW\", \"NEPRELIM\", \"WBC\", \"PLT\" in the last 168 hours.      No results for input(s): \"BNP\" in the last 168 hours.  No results found for: \"PT\", \"INR\"  No results found for: \"TROP\"      No results found.     [facility] 5/28/25     1. The study quality is good.   2. History of cardiac catherization, 5/23/2025 with right groin access.  3. Pseudoaneurysm visualized in the right groin measuring 2.5 x 2.2 x 2.4 cm with a neck connection measuring 0.57 cm to the distal right common femoral  artery.  4. No evidence of AV fistula in the right   groin.  5. No evidence of deep venous thrombosis of the  right distal external iliac, common femoral, profunda femoral and proximal superficial femoral veins with  normal venous blood flow patterns.  6. Dr. Fang notified.      Assessment:  Right groin pseudoaneurysm  US above. Dr. Ashley discussed with OP PA and IR unable to do thrombin injection, thus direct admitted for surgical repair.   CAD s/p [facility] 5/23  LAD occluded mid with L-L and R-L collaterals, Lcx ok, RCA previous stents patent  Plan for medical therapy vs future   Had abnormal Cardiac PET leading to diagnostic [facility]  CAD with prior MI/stenting to RCA  Bicuspid AV with mild-mod AI  LVEF 60-65%  HL-on statin, zetia  HTN    Plan:  Labs stable.   Dr. Ashley to see. CTA abd/pelvis, LE ordered   Cont home cardiac regimen.         Bridgette RAJAT Eduardo, APRN  5/29/2025  2:31 PM  [phone]      Seen and examined last evening in office and seen again this evening to review situation.    Has developed a right CFA pseudoaneurysm post cath despite ultrasound guidance, a micropuncture technique and post procedure angioseal with prolonged manual hold by myself for extra protection. Unfortunately IR felt the neck was too wide for an attempt at thrombin injection and/or ultrasound closure and he will need local surgical repair (Dr. Ashley)    Reviewed anatomy and plan for repair in detail with Luigi.    Known CAD yet clinically stable with good LV function. Plan for ongoing medical therapy.    Acceptable cardiac risk to proceed with PV surgery tomorrow as planned - hopefully home Saturday.    Follow up will be with Dr. Ashley and myself in the office        H/P 3       [1]   Past Medical History:   Coronary atherosclerosis    Heart attack (HCC)    High cholesterol   [2]   Past Surgical History:  Procedure Laterality Date    Cath drug eluting stent      Cath percutaneous  transluminal coronary angioplasty     [3] No family history on file.  [4] No Known Allergies  [5] No current facility-administered medications for this encounter.

## 2025-05-29 NOTE — TRANSFER CENTER NOTE
DIRECT ADMISSION    Admitting MD: Dr. Dick  Called in by: Chery SELLERS at [facility] of Dr. Lamont Gandhi office  Call back #: RN cell phone # was provided  Date of admission: 5/29/25  Diagnosis: Pseudoaneurysm not able to be done o/p via thrombin injection will require sx intervention Dr. Ashley to be on consult.   Specialist MD:  Dr. Ashley  Hospitalist assigned: Dr. Dick  Type of admission: INPT  Type of bed: Cardiac Telemetry  Time of admission: 1430 (estimation)  Insurance Verification complete: Yes

## 2025-05-29 NOTE — H&P
[facility]     Luigi Mckinney Patient Status:  Inpatient    1965 MRN [id number]   [facility] 7NE-A Attending Emory Dick MD   Hosp Day # 0 PCP SHARMAINE TOWNSEND     Chief Complaint: Right groin swelling/bruising     Subjective:    History of Present Illness:     Luigi Mckinney is a 59 year old male with a PMH of CAD with prior PCI and recent diagnostic LHC  was directly admitted to hospital at request of cardiology due to right groin pseudoaneurysm found on ultrasound . Patient reports increased swelling and bruising of right groin since procedure . Denies pain. Denies CP/SOB/N/V.     History/Other:    Past Medical History:  Past Medical History[1]  Past Surgical History:   Past Surgical History[2]   Family History:   Family History[3]  Social History:    reports that he has never smoked. He has never used smokeless tobacco. He reports current alcohol use. He reports that he does not use drugs.     Allergies: Allergies[4]    Medications:  Medications Ordered Prior to Encounter[5]    Review of Systems:   A comprehensive review of systems was completed.    Pertinent positives and negatives noted in the HPI.    Objective:   Physical Exam:    BP (!) 142/91   Pulse 65   Resp 17   Wt 205 lb (93 kg)   SpO2 95%   BMI 30.27 kg/m²   General: No acute distress, Alert  Respiratory: No rhonchi, no wheezes  Cardiovascular: S1, S2. Regular rate and rhythm  Abdomen: Soft, Non-tender, non-distended, positive bowel sounds  Neuro: No new focal deficits  Extremities: Right groin edema with ecchymosis.     Results:    Labs:      Labs Last 24 Hours:    No results for input(s): \"RBC\", \"HGB\", \"HCT\", \"MCV\", \"MCH\", \"MCHC\", \"RDW\", \"NEPRELIM\", \"WBC\", \"PLT\" in the last 168 hours.    No results for input(s): \"GLU\", \"BUN\", \"CREATSERUM\", \"GFRAA\", \"GFRNAA\", \"EGFRCR\", \"CA\", \"ALB\", \"NA\", \"K\", \"CL\", \"CO2\", \"ALKPHO\", \"AST\", \"ALT\", \"BILT\", \"TP\" in the last 168 hours.    eGFR cannot  be calculated (Patient's most recent lab result is older than the maximum 7 days allowed.).    No results found for: \"PT\", \"INR\"    No results for input(s): \"TROP\", \"TROPHS\", \"CK\" in the last 168 hours.    No results for input(s): \"TROP\", \"PBNP\" in the last 168 hours.    No results for input(s): \"PCT\" in the last 168 hours.    Imaging: Imaging data reviewed in Epic.    Assessment & Plan:      #Right femoral artery pseudoaneurysm in setting of recent cardiac cath   -CTA A/P pending  -Vascular surgery and cardiology on consult    #CAD with prior PCI  #Recent abnormal stress test  -[facility] report 5/23 reviewed - no intervention performed at that time  -Continue statin  -Resume ASA when ok with vascular/cardiology     #DL  -Statin/zetia     Plan of care discussed with patient and ED physician.     Emery Treviño DO    Supplementary Documentation:     The 21st Century Cures Act makes medical notes like these available to patients in the interest of transparency. Please be advised this is a medical document. Medical documents are intended to carry relevant information, facts as evident, and the clinical opinion of the practitioner. The medical note is intended as peer to peer communication and may appear blunt or direct. It is written in medical language and may contain abbreviations or verbiage that are unfamiliar.                                       [1]   Past Medical History:   Coronary atherosclerosis    Heart attack (HCC)    High cholesterol   [2]   Past Surgical History:  Procedure Laterality Date    Cath drug eluting stent      Cath percutaneous  transluminal coronary angioplasty     [3] No family history on file.  [4] No Known Allergies  [5]   Current Facility-Administered Medications on File Prior to Encounter   Medication Dose Route Frequency Provider Last Rate Last Admin    [COMPLETED] sodium chloride 0.9% infusion   Intravenous On Call Lamont Gandhi MD 20 mL/hr at 05/23/25 0841 New Bag at 05/23/25 0841     [COMPLETED] lidocaine PF (Xylocaine-MPF) 1 % injection             [COMPLETED] verapamil (Isoptin) 2.5 mg/mL injection             [COMPLETED] heparin (Porcine) 5000 UNIT/ML injection             [COMPLETED] Nitroglycerin in D5W 200-5 MCG/ML-% injection             [COMPLETED] fentaNYL (Sublimaze) 50 mcg/mL injection             [COMPLETED] midazolam (Versed) 2 MG/2ML injection             [COMPLETED] heparin (Porcine) 5000 UNIT/ML injection             [COMPLETED] iopamidol 76% (ISOVUE-370) injection for power injector  200 mL Injection ONCE PRN Lamont Gandhi MD   170 mL at 05/23/25 1124    [COMPLETED] heparin (Porcine) 5000 UNIT/ML injection             [COMPLETED] fentaNYL (Sublimaze) 50 mcg/mL injection             [COMPLETED] midazolam (Versed) 2 MG/2ML injection              Current Outpatient Medications on File Prior to Encounter   Medication Sig Dispense Refill    aspirin 81 MG Oral Tab EC Take 1 tablet (81 mg total) by mouth daily.      atorvastatin 20 MG Oral Tab Take 1 tablet (20 mg total) by mouth nightly.      ezetimibe 10 MG Oral Tab Take 1 tablet (10 mg total) by mouth nightly.

## 2025-05-29 NOTE — CONSULTS
[facility]    PATIENT'S NAME: JUAN JOSE RAYGOZA   ATTENDING PHYSICIAN: Emery Treviño D.O.   CONSULTING PHYSICIAN: Varinder Ashley M.D.   PATIENT ACCOUNT#:   [id number]    LOCATION:  [address] [place] [address]  MEDICAL RECORD #:   [id number]       YOB: 1965  ADMISSION DATE:       05/29/2025      CONSULT DATE:  05/29/2025    REPORT OF CONSULTATION    HISTORY OF PRESENT ILLNESS:  A 59-year-old white male who had undergone a cardiac cath, had a pseudoaneurysm found on duplex ultrasound by [facility].  He was sent home after the procedure to possibly have this treated with an interventional radiology duplex ultrasound thrombin injection.  The patient's films were reviewed by IR and thought that the pseudoaneurysm was too large for this procedure.  He has known history of coronary artery disease with prior coronary interventions.  His most recent one prior to this was May 2023 at [facility] with left heart catheterization.  The patient has a right femoral artery aneurysm.  His chart was reviewed.      ALLERGIES:  He has no known allergies.      SOCIAL HISTORY:  He has had no history of any EtOH abuse, drug abuse, or tobacco abuse.      REVIEW OF SYSTEMS:  Reportedly, no hematuria, dysuria, hemoptysis, cough, or sputum production.  No weight loss, fever, chills, hematemesis, or bright blood per rectum.        PHYSICAL EXAMINATION:  He has swelling in his right groin.    Ultrasound demonstrated a pseudoaneurysm, not amenable to a thrombin injection.      ASSESSMENT AND PLAN:  He is scheduled for a CT angiogram, and we are waiting for this.  He is scheduled for tomorrow morning to do a right femoral artery repair with correction of the pseudoaneurysm with the risks and complications explained.  We will keep him n.p.o. after midnight and wait for the CT angiogram and review in the morning with the patient.    Dictated By Varinder Ashley M.D.  d: 05/29/2025 17:52:27  t: 05/29/2025  18:04:50  [facility] [id number]  [facility]/    cc: Varinder Ashley M.D.

## 2025-05-30 ENCOUNTER — ANESTHESIA (OUTPATIENT)
Dept: CARDIAC SURGERY | Facility: HOSPITAL | Age: 60
End: 2025-05-30
Payer: COMMERCIAL

## 2025-05-30 PROBLEM — Q23.81 BICUSPID AORTIC VALVE: Chronic | Status: ACTIVE | Noted: 2025-05-30

## 2025-05-30 PROBLEM — I72.9 PSEUDOANEURYSM: Status: RESOLVED | Noted: 2025-05-29 | Resolved: 2025-05-30

## 2025-05-30 PROBLEM — I72.4: Status: ACTIVE | Noted: 2025-05-30

## 2025-05-30 PROBLEM — I25.10 CORONARY ARTERY DISEASE INVOLVING NATIVE CORONARY ARTERY OF NATIVE HEART: Chronic | Status: ACTIVE | Noted: 2025-05-30

## 2025-05-30 LAB
APTT PPP: 30.5 SECONDS (ref 23–36)
ERYTHROCYTE [DISTWIDTH] IN BLOOD BY AUTOMATED COUNT: 12.7 %
HCT VFR BLD AUTO: 41.4 % (ref 39–53)
HGB BLD-MCNC: 13.8 G/DL (ref 13–17.5)
INR BLD: 1.07 (ref 0.8–1.2)
MCH RBC QN AUTO: 30.2 PG (ref 26–34)
MCHC RBC AUTO-ENTMCNC: 33.3 G/DL (ref 31–37)
MCV RBC AUTO: 90.6 FL (ref 80–100)
PLATELET # BLD AUTO: 244 10(3)UL (ref 150–450)
PROTHROMBIN TIME: 14 SECONDS (ref 11.6–14.8)
RBC # BLD AUTO: 4.57 X10(6)UL (ref 4.3–5.7)
RH BLOOD TYPE: NEGATIVE
WBC # BLD AUTO: 10.1 X10(3) UL (ref 4–11)

## 2025-05-30 PROCEDURE — 04BK0ZZ EXCISION OF RIGHT FEMORAL ARTERY, OPEN APPROACH: ICD-10-PCS | Performed by: SURGERY

## 2025-05-30 PROCEDURE — 99232 SBSQ HOSP IP/OBS MODERATE 35: CPT | Performed by: HOSPITALIST

## 2025-05-30 RX ORDER — TRAMADOL HYDROCHLORIDE 50 MG/1
50 TABLET ORAL EVERY 6 HOURS PRN
Status: DISCONTINUED | OUTPATIENT
Start: 2025-05-30 | End: 2025-05-31

## 2025-05-30 RX ORDER — HYDROCODONE BITARTRATE AND ACETAMINOPHEN 5; 325 MG/1; MG/1
2 TABLET ORAL ONCE AS NEEDED
Status: DISCONTINUED | OUTPATIENT
Start: 2025-05-30 | End: 2025-05-30 | Stop reason: HOSPADM

## 2025-05-30 RX ORDER — ATORVASTATIN CALCIUM 20 MG/1
20 TABLET, FILM COATED ORAL NIGHTLY
Status: DISCONTINUED | OUTPATIENT
Start: 2025-05-30 | End: 2025-05-30 | Stop reason: ALTCHOICE

## 2025-05-30 RX ORDER — HYDROMORPHONE HYDROCHLORIDE 1 MG/ML
0.6 INJECTION, SOLUTION INTRAMUSCULAR; INTRAVENOUS; SUBCUTANEOUS EVERY 5 MIN PRN
Status: DISCONTINUED | OUTPATIENT
Start: 2025-05-30 | End: 2025-05-30 | Stop reason: HOSPADM

## 2025-05-30 RX ORDER — HYDROCODONE BITARTRATE AND ACETAMINOPHEN 5; 325 MG/1; MG/1
1-2 TABLET ORAL EVERY 4 HOURS PRN
Qty: 40 TABLET | Refills: 0 | Status: SHIPPED | OUTPATIENT
Start: 2025-05-30

## 2025-05-30 RX ORDER — PROTAMINE SULFATE 10 MG/ML
INJECTION, SOLUTION INTRAVENOUS AS NEEDED
Status: DISCONTINUED | OUTPATIENT
Start: 2025-05-30 | End: 2025-05-30 | Stop reason: SURG

## 2025-05-30 RX ORDER — ROCURONIUM BROMIDE 10 MG/ML
INJECTION, SOLUTION INTRAVENOUS AS NEEDED
Status: DISCONTINUED | OUTPATIENT
Start: 2025-05-30 | End: 2025-05-30 | Stop reason: SURG

## 2025-05-30 RX ORDER — FAMOTIDINE 20 MG/1
20 TABLET, FILM COATED ORAL 2 TIMES DAILY
Status: DISCONTINUED | OUTPATIENT
Start: 2025-05-30 | End: 2025-05-31

## 2025-05-30 RX ORDER — ONDANSETRON 2 MG/ML
INJECTION INTRAMUSCULAR; INTRAVENOUS AS NEEDED
Status: DISCONTINUED | OUTPATIENT
Start: 2025-05-30 | End: 2025-05-30 | Stop reason: SURG

## 2025-05-30 RX ORDER — HYDROCODONE BITARTRATE AND ACETAMINOPHEN 5; 325 MG/1; MG/1
1 TABLET ORAL EVERY 4 HOURS PRN
Status: DISCONTINUED | OUTPATIENT
Start: 2025-05-30 | End: 2025-05-31

## 2025-05-30 RX ORDER — HEPARIN SODIUM 1000 [USP'U]/ML
INJECTION, SOLUTION INTRAVENOUS; SUBCUTANEOUS AS NEEDED
Status: DISCONTINUED | OUTPATIENT
Start: 2025-05-30 | End: 2025-05-30 | Stop reason: SURG

## 2025-05-30 RX ORDER — SENNOSIDES 8.6 MG
17.2 TABLET ORAL NIGHTLY PRN
Status: DISCONTINUED | OUTPATIENT
Start: 2025-05-30 | End: 2025-05-30

## 2025-05-30 RX ORDER — NALOXONE HYDROCHLORIDE 0.4 MG/ML
0.08 INJECTION, SOLUTION INTRAMUSCULAR; INTRAVENOUS; SUBCUTANEOUS AS NEEDED
Status: DISCONTINUED | OUTPATIENT
Start: 2025-05-30 | End: 2025-05-30 | Stop reason: HOSPADM

## 2025-05-30 RX ORDER — HYDROMORPHONE HYDROCHLORIDE 1 MG/ML
INJECTION, SOLUTION INTRAMUSCULAR; INTRAVENOUS; SUBCUTANEOUS
Status: COMPLETED
Start: 2025-05-30 | End: 2025-05-30

## 2025-05-30 RX ORDER — CLOPIDOGREL BISULFATE 75 MG/1
75 TABLET ORAL DAILY
Status: DISCONTINUED | OUTPATIENT
Start: 2025-05-30 | End: 2025-05-31

## 2025-05-30 RX ORDER — FAMOTIDINE 10 MG/ML
20 INJECTION, SOLUTION INTRAVENOUS 2 TIMES DAILY
Status: DISCONTINUED | OUTPATIENT
Start: 2025-05-30 | End: 2025-05-31

## 2025-05-30 RX ORDER — PROCHLORPERAZINE EDISYLATE 5 MG/ML
5 INJECTION INTRAMUSCULAR; INTRAVENOUS EVERY 8 HOURS PRN
Status: DISCONTINUED | OUTPATIENT
Start: 2025-05-30 | End: 2025-05-30 | Stop reason: HOSPADM

## 2025-05-30 RX ORDER — ONDANSETRON 2 MG/ML
4 INJECTION INTRAMUSCULAR; INTRAVENOUS EVERY 6 HOURS PRN
Status: DISCONTINUED | OUTPATIENT
Start: 2025-05-30 | End: 2025-05-30 | Stop reason: HOSPADM

## 2025-05-30 RX ORDER — DEXAMETHASONE SODIUM PHOSPHATE 4 MG/ML
VIAL (ML) INJECTION AS NEEDED
Status: DISCONTINUED | OUTPATIENT
Start: 2025-05-30 | End: 2025-05-30 | Stop reason: SURG

## 2025-05-30 RX ORDER — ACETAMINOPHEN 500 MG
1000 TABLET ORAL ONCE AS NEEDED
Status: DISCONTINUED | OUTPATIENT
Start: 2025-05-30 | End: 2025-05-30 | Stop reason: HOSPADM

## 2025-05-30 RX ORDER — POLYETHYLENE GLYCOL 3350 17 G/17G
17 POWDER, FOR SOLUTION ORAL DAILY PRN
Status: DISCONTINUED | OUTPATIENT
Start: 2025-05-30 | End: 2025-05-30

## 2025-05-30 RX ORDER — SODIUM CHLORIDE 9 MG/ML
INJECTION, SOLUTION INTRAVENOUS CONTINUOUS
Status: ACTIVE | OUTPATIENT
Start: 2025-05-30 | End: 2025-05-30

## 2025-05-30 RX ORDER — MIDAZOLAM HYDROCHLORIDE 1 MG/ML
1 INJECTION INTRAMUSCULAR; INTRAVENOUS EVERY 5 MIN PRN
Status: DISCONTINUED | OUTPATIENT
Start: 2025-05-30 | End: 2025-05-30 | Stop reason: HOSPADM

## 2025-05-30 RX ORDER — TRAMADOL HYDROCHLORIDE 50 MG/1
100 TABLET ORAL EVERY 6 HOURS PRN
Status: DISCONTINUED | OUTPATIENT
Start: 2025-05-30 | End: 2025-05-31

## 2025-05-30 RX ORDER — ACETAMINOPHEN 325 MG/1
650 TABLET ORAL EVERY 4 HOURS PRN
Status: DISCONTINUED | OUTPATIENT
Start: 2025-05-30 | End: 2025-05-31

## 2025-05-30 RX ORDER — SODIUM CHLORIDE 9 MG/ML
INJECTION, SOLUTION INTRAVENOUS CONTINUOUS PRN
Status: DISCONTINUED | OUTPATIENT
Start: 2025-05-30 | End: 2025-05-30 | Stop reason: SURG

## 2025-05-30 RX ORDER — GLYCOPYRROLATE 0.2 MG/ML
INJECTION, SOLUTION INTRAMUSCULAR; INTRAVENOUS AS NEEDED
Status: DISCONTINUED | OUTPATIENT
Start: 2025-05-30 | End: 2025-05-30 | Stop reason: SURG

## 2025-05-30 RX ORDER — ASPIRIN 81 MG/1
81 TABLET ORAL DAILY
Status: DISCONTINUED | OUTPATIENT
Start: 2025-05-30 | End: 2025-05-31

## 2025-05-30 RX ORDER — ONDANSETRON 2 MG/ML
4 INJECTION INTRAMUSCULAR; INTRAVENOUS EVERY 6 HOURS PRN
Status: DISCONTINUED | OUTPATIENT
Start: 2025-05-30 | End: 2025-05-31

## 2025-05-30 RX ORDER — BISACODYL 10 MG
10 SUPPOSITORY, RECTAL RECTAL
Status: DISCONTINUED | OUTPATIENT
Start: 2025-05-30 | End: 2025-05-30

## 2025-05-30 RX ORDER — HYDROCODONE BITARTRATE AND ACETAMINOPHEN 5; 325 MG/1; MG/1
2 TABLET ORAL EVERY 4 HOURS PRN
Status: DISCONTINUED | OUTPATIENT
Start: 2025-05-30 | End: 2025-05-31

## 2025-05-30 RX ORDER — SODIUM CHLORIDE, SODIUM LACTATE, POTASSIUM CHLORIDE, CALCIUM CHLORIDE 600; 310; 30; 20 MG/100ML; MG/100ML; MG/100ML; MG/100ML
INJECTION, SOLUTION INTRAVENOUS CONTINUOUS
Status: DISCONTINUED | OUTPATIENT
Start: 2025-05-30 | End: 2025-05-30 | Stop reason: HOSPADM

## 2025-05-30 RX ORDER — MIDAZOLAM HYDROCHLORIDE 1 MG/ML
INJECTION INTRAMUSCULAR; INTRAVENOUS AS NEEDED
Status: DISCONTINUED | OUTPATIENT
Start: 2025-05-30 | End: 2025-05-30 | Stop reason: SURG

## 2025-05-30 RX ORDER — HYDROMORPHONE HYDROCHLORIDE 1 MG/ML
0.2 INJECTION, SOLUTION INTRAMUSCULAR; INTRAVENOUS; SUBCUTANEOUS EVERY 5 MIN PRN
Status: DISCONTINUED | OUTPATIENT
Start: 2025-05-30 | End: 2025-05-30 | Stop reason: HOSPADM

## 2025-05-30 RX ORDER — EZETIMIBE 10 MG/1
10 TABLET ORAL NIGHTLY
Status: DISCONTINUED | OUTPATIENT
Start: 2025-05-30 | End: 2025-05-30

## 2025-05-30 RX ORDER — LIDOCAINE HYDROCHLORIDE 10 MG/ML
INJECTION, SOLUTION EPIDURAL; INFILTRATION; INTRACAUDAL; PERINEURAL AS NEEDED
Status: DISCONTINUED | OUTPATIENT
Start: 2025-05-30 | End: 2025-05-30 | Stop reason: SURG

## 2025-05-30 RX ORDER — NEOSTIGMINE METHYLSULFATE 1 MG/ML
INJECTION INTRAVENOUS AS NEEDED
Status: DISCONTINUED | OUTPATIENT
Start: 2025-05-30 | End: 2025-05-30 | Stop reason: SURG

## 2025-05-30 RX ORDER — ATORVASTATIN CALCIUM 40 MG/1
40 TABLET, FILM COATED ORAL NIGHTLY
Status: DISCONTINUED | OUTPATIENT
Start: 2025-05-30 | End: 2025-05-31

## 2025-05-30 RX ORDER — HYDROMORPHONE HYDROCHLORIDE 1 MG/ML
0.4 INJECTION, SOLUTION INTRAMUSCULAR; INTRAVENOUS; SUBCUTANEOUS EVERY 5 MIN PRN
Status: DISCONTINUED | OUTPATIENT
Start: 2025-05-30 | End: 2025-05-30 | Stop reason: HOSPADM

## 2025-05-30 RX ORDER — HYDROCODONE BITARTRATE AND ACETAMINOPHEN 5; 325 MG/1; MG/1
1 TABLET ORAL ONCE AS NEEDED
Status: DISCONTINUED | OUTPATIENT
Start: 2025-05-30 | End: 2025-05-30 | Stop reason: HOSPADM

## 2025-05-30 RX ORDER — SODIUM CHLORIDE, SODIUM LACTATE, POTASSIUM CHLORIDE, CALCIUM CHLORIDE 600; 310; 30; 20 MG/100ML; MG/100ML; MG/100ML; MG/100ML
INJECTION, SOLUTION INTRAVENOUS CONTINUOUS
Status: DISCONTINUED | OUTPATIENT
Start: 2025-05-30 | End: 2025-05-31

## 2025-05-30 RX ORDER — SODIUM PHOSPHATE, DIBASIC AND SODIUM PHOSPHATE, MONOBASIC 7; 19 G/230ML; G/230ML
1 ENEMA RECTAL ONCE AS NEEDED
Status: DISCONTINUED | OUTPATIENT
Start: 2025-05-30 | End: 2025-05-31

## 2025-05-30 RX ADMIN — ROCURONIUM BROMIDE 70 MG: 10 INJECTION, SOLUTION INTRAVENOUS at 10:37:00

## 2025-05-30 RX ADMIN — GLYCOPYRROLATE 0.4 MG: 0.2 INJECTION, SOLUTION INTRAMUSCULAR; INTRAVENOUS at 12:23:00

## 2025-05-30 RX ADMIN — PROTAMINE SULFATE 35 MG: 10 INJECTION, SOLUTION INTRAVENOUS at 11:58:00

## 2025-05-30 RX ADMIN — HEPARIN SODIUM 12000 UNITS: 1000 INJECTION, SOLUTION INTRAVENOUS; SUBCUTANEOUS at 11:34:00

## 2025-05-30 RX ADMIN — NEOSTIGMINE METHYLSULFATE 2 MG: 1 INJECTION INTRAVENOUS at 12:23:00

## 2025-05-30 RX ADMIN — DEXAMETHASONE SODIUM PHOSPHATE 4 MG: 4 MG/ML VIAL (ML) INJECTION at 10:41:00

## 2025-05-30 RX ADMIN — ROCURONIUM BROMIDE 20 MG: 10 INJECTION, SOLUTION INTRAVENOUS at 11:27:00

## 2025-05-30 RX ADMIN — SODIUM CHLORIDE: 9 INJECTION, SOLUTION INTRAVENOUS at 10:33:00

## 2025-05-30 RX ADMIN — LIDOCAINE HYDROCHLORIDE 100 MG: 10 INJECTION, SOLUTION EPIDURAL; INFILTRATION; INTRACAUDAL; PERINEURAL at 10:36:00

## 2025-05-30 RX ADMIN — PROTAMINE SULFATE 5 MG: 10 INJECTION, SOLUTION INTRAVENOUS at 12:12:00

## 2025-05-30 RX ADMIN — ONDANSETRON 4 MG: 2 INJECTION INTRAMUSCULAR; INTRAVENOUS at 12:23:00

## 2025-05-30 RX ADMIN — MIDAZOLAM HYDROCHLORIDE 2 MG: 1 INJECTION INTRAMUSCULAR; INTRAVENOUS at 10:34:00

## 2025-05-30 NOTE — ANESTHESIA POSTPROCEDURE EVALUATION
[facility]    Luigi Mckinney Patient Status:  Inpatient   Age/Gender 59 year old male MRN [id number]   Location [facility] 7NE-A Attending Emery Treviño DO   Hosp Day # 1 PCP SHARMAINE TOWNSEND       Anesthesia Post-op Note    RIGHT FEMORAL ARTERY PSEUDOANEURYSM REPAIR    Procedure Summary       Date: 05/30/25 Room / Location:  [facility] 01 /  [facility]    Anesthesia Start: 1033 Anesthesia Stop: 1302    Procedure: RIGHT FEMORAL ARTERY PSEUDOANEURYSM REPAIR (Right) Diagnosis: (right femoral pseudoaneurysm)    Surgeons: Varinder Ashley MD Anesthesiologist: Jory Blake MD    Anesthesia Type: general ASA Status: 3            Anesthesia Type: general    Vitals Value Taken Time   /80 05/30/25 13:08   Temp 97 °F (36.1 °C) 05/30/25 12:53   Pulse 65 05/30/25 13:18   Resp 14 05/30/25 13:18   SpO2 99 % 05/30/25 13:18   Vitals shown include unfiled device data.        Patient Location: PACU    Anesthesia Type: general    Airway Patency: patent and extubated    Postop Pain Control: adequate    Mental Status: mildly sedated but able to meaningfully participate in the post-anesthesia evaluation    Nausea/Vomiting: none    Cardiopulmonary/Hydration status: stable euvolemic    Complications: no apparent anesthesia related complications    Postop vital signs: stable    Dental Exam: Unchanged from Preop    Patient to be discharged from PACU when criteria met.

## 2025-05-30 NOTE — OPERATIVE REPORT
Pre-op Dx: Right femoral pseudoaneurysm. Post-op Dx: Same. Procedure: Repair Right femoral pseudoaneurysm. Surgeon: Dion/ Asst: Ishmael Garibay. [id number]. 700cc crystalloid

## 2025-05-30 NOTE — OPERATIVE REPORT
[facility]    PATIENT'S NAME: JUAN JOSE RAYGOZA   ATTENDING PHYSICIAN: Emery Treviño D.O.   OPERATING PHYSICIAN: Varinder Ashley M.D.   PATIENT ACCOUNT#:   [id number]    LOCATION:  [address] [place] [address]  MEDICAL RECORD #:   [id number]       YOB: 1965  ADMISSION DATE:       05/29/2025      OPERATION DATE:  05/30/2025    OPERATIVE REPORT    PREOPERATIVE DIAGNOSIS:  Right common femoral artery pseudoaneurysm, status post cardiac catheterization, over 3 cm with a short neck.    POSTOPERATIVE DIAGNOSIS:  Right common femoral artery pseudoaneurysm, status post cardiac catheterization, over 3 cm with a short neck, with pseudoaneurysm on the right common femoral artery.    PROCEDURE:  Exploration of right groin with exposure of the right external iliac artery and superficial femoral artery, and primary repair of the pseudoaneurysm with debridement and removal of the mural thrombus and aneurysm wall.      ASSISTANT:  NATHAN Carolina     INDICATIONS:  This is a 59-year-old white male who had a cardiac cath by Dr. Gandhi, evaluating his coronary artery circulation.  He had a previous intervention years ago.  The patient had an Angio-Seal placed and seemed to be dry at the end of the procedure with good hemostasis.  However, he went home and came back in with a pseudoaneurysm.  He was evaluated by IR for a duplex ultrasound-guided thrombin injection, but the neck was too short, and there were concerns for causing problems with this, so he was recommended for open repair.  Risks and complications of this procedure were discussed with the patient prior to the procedure.  CT angiogram and the clinical examination showed the aneurysm above the inguinal crease in the femoral artery towards the high side of the femoral artery.  I discussed with the patient the possibility of taking down part of the external oblique aponeurosis to get the exposure and aware of the risk of bleeding, cardiac complications, death,  infection, arterial steal, leg loss, groin wound infection.  Risks and complications of not doing the procedure were also discussed.  Patient understood and agreed.  All questions answered.    OPERATIVE TECHNIQUE:  Patient was placed supine on procedure table, underwent general anesthesia, received preoperative antibiotics.  No Banks was placed.  He had the right groin prepped and draped in the usual sterile technique.  Ioban Vi-Drape was used to cover the operative field.  After a time-out was done, an incision was made slightly curvilinear in the right groin above the inguinal ligament.  Dissection brought down going around the lymphatics to expose the inguinal ligament and then eventually the superficial femoral artery.  The patient's arteries were very deep, as he had an extensive amount of soft tissue/subcutaneous fat on him.    Hernandez retractor was used for exposure.  The external oblique aponeurosis had to be divided with the muscles retracted to expose the external iliac artery proximal in order to avoid going into the aneurysm without proximal control.  The artery was dissected out and prepared for a possible Haque clamp.  The artery was dissected out distally.  This was on the superficial femoral artery, and it was prepared for a profunda clamp.  The patient received a bolus of heparin, and after a greater than 6-minute period of time with the blood pressure up, he had the Haque clamp placed on the external iliac artery, a profunda clamp on the superficial femoral artery, and dissection was brought down along the anterior wall of the artery starting from the superficial femoral artery going upwards until the puncture site was identified.  A peanut was put on this area.  Most of the aneurysm wall was removed from the wall of the artery with the inflammatory reaction and the mural thrombus, and this puncture site was closed with two 5-0 Prolene sutures interrupted.  Had good hemostasis.  It was found  that the closure device was seen in the subcutaneous tissues anterior to the wall of the artery deployed above the area of the artery anteriorly.  There was no closure device inside the artery.  The patient then had the area debrided to remove all the pseudoaneurysm wall and mural thrombus inflammatory reaction.  It was irrigated, and then cautery was done for hemostasis as well as hemoclips and Bovie coagulation, and then protamine to reverse the bolus of heparin that was given prior to placement of the clamps.    The patient had Gelfoam and thrombin and then FloSeal used.  The external oblique aponeurosis was then closed with interrupted 4-0 Prolene suture.  At the end of the procedure, the wound was clean and dry, and it was closed with 3 layers of 2-0 Vicryl and 3-0 Vicryl subcuticular, anesthetized with 30 mL of 0.25% Marcaine plain, with Dermabond, Telfa, and Tegaderm placed.  He has palpable pulses.  Neurologically, he is intact.  Blood loss was approximately 100 to 150 mL.  The patient received about 600 to 700 mL of crystalloid.     Dictated By Varinder Ashley M.D.  d: 05/30/2025 12:34:41  t: 05/30/2025 13:17:36  Job [id number]  [facility]/    cc: ISAAC Keith M.D.

## 2025-05-30 NOTE — PLAN OF CARE
Assumed care at 1930   A&Ox4, RA, NSR   R groin bruised/swollen, tender to palpation   Denies pain   Up ad martina to bathroom   NPO at midnight   Call light in reach, questions answered, needs met

## 2025-05-30 NOTE — PROGRESS NOTES
[facility]   part of [facility]     Hospitalist Progress Note     Luigi Mckinney Patient Status:  Inpatient    1965 MRN [id number]   Location [facility] 7NE-A Attending Emery Treviño DO   Hosp Day # 1 PCP SHARMAINE TOWNSEND     Chief Complaint: Right groin edema/ecchymosis     Subjective:     Patient seen and examined. Minimal right groin discomfort. Bruising about the same.     Objective:    Review of Systems:   A comprehensive review of systems was completed; pertinent positive and negatives stated in subjective.    Vital signs:  Temp:  [97 °F (36.1 °C)-98.1 °F (36.7 °C)] 97 °F (36.1 °C)  Pulse:  [59-67] 67  Resp:  [13-19] 13  BP: (113-149)/(67-91) 134/67  SpO2:  [94 %-100 %] 100 %    Physical Exam:    General: No acute distress  Respiratory: No wheezes, no rhonchi  Cardiovascular: S1, S2, regular rate and rhythm  Abdomen: Soft, Non-tender, non-distended, positive bowel sounds  Neuro: No new focal deficits.   Extremities: No edema    Diagnostic Data:    Labs:  Recent Labs   Lab 25  1538   WBC 6.3   HGB 12.8*   MCV 88.9   .0       Recent Labs   Lab 25  1538   GLU 93   BUN 14   CREATSERUM 1.07   CA 9.8      K 4.0      CO2 26.0       Estimated Glomerular Filtration Rate: 80 mL/min/1.73m2 (result from lab).    No results for input(s): \"TROP\", \"TROPHS\", \"CK\" in the last 168 hours.    No results for input(s): \"PTP\", \"INR\" in the last 168 hours.               Microbiology    No results found for this visit on 25.      Imaging: Reviewed in Epic.    Medications: Scheduled Medications[1]    Assessment & Plan:      #Right femoral artery pseudoaneurysm in setting of recent cardiac cath   -CTA A/P reviewed   -Vascular surgery and cardiology following  -Plan for surgical exploration today      #CAD with prior PCI  #Recent abnormal stress test  -[facility] report  reviewed - no intervention performed at that time  -Continue statin  -Resume ASA if ok with vascular/cardiology       #DL  -Statin/luctia      Emery Treviño DO    Supplementary Documentation:     Quality:  DVT Mechanical Prophylaxis:     Early ambuation  DVT Pharmacologic Prophylaxis   Medication    heparin in sodium chloride 0.9% ((Porcine)) 5000 UNITS - 500 ML for procedural use                Code Status: Not on file  Banks: No urinary catheter in place  Banks Duration (in days):   Central line:    MARIA EUGENIA: 5/31/2025    Discharge is dependent on: clinical progress  At this point Mr. Mckinney is expected to be discharge to: home     The 21st Century Cures Act makes medical notes like these available to patients in the interest of transparency. Please be advised this is a medical document. Medical documents are intended to carry relevant information, facts as evident, and the clinical opinion of the practitioner. The medical note is intended as peer to peer communication and may appear blunt or direct. It is written in medical language and may contain abbreviations or verbiage that are unfamiliar.                         [1]    atorvastatin  20 mg Oral Nightly    ezetimibe  10 mg Oral Nightly    ceFAZolin  2 g Intravenous Q8H

## 2025-05-30 NOTE — PROGRESS NOTES
[facility] Cardiology Progress Note    Luigi Mckinney Patient Status:  Inpatient    1965 MRN [id number]   Location [facility] 7NE-A Attending Emery Treviño,    Hosp Day # 1 PCP SHARMAINE TOWNSEND     Subjective: Early POD #0 surgical repair of right femoral artery pseudoaneurysm.  Mild groin area tenderness, otherwise feels good.  No acute events overnight  Just got back from pseudoaneurysm repair, pain controlled     Objective:  /86   Pulse 65   Temp 97.8 °F (36.6 °C) (Temporal)   Resp 11   Wt 205 lb (93 kg)   SpO2 98%   BMI 30.27 kg/m²     Telemetry: nsr      Intake/Output:    Intake/Output Summary (Last 24 hours) at 2025 1432  Last data filed at 2025 1302  Gross per 24 hour   Intake 610 ml   Output 150 ml   Net 460 ml       Last 3 Weights   25 1512 205 lb (93 kg)   25 0826 210 lb (95.3 kg)   25 1110 210 lb (95.3 kg)       Labs:  Recent Labs   Lab 25  1538   GLU 93   BUN 14   CREATSERUM 1.07   EGFRCR 80   CA 9.8      K 4.0      CO2 26.0     Recent Labs   Lab 25  1538   RBC 4.22*   HGB 12.8*   HCT 37.5*   MCV 88.9   MCH 30.3   MCHC 34.1   RDW 12.7   NEPRELIM 3.72   WBC 6.3   .0         No results for input(s): \"TROP\", \"TROPHS\", \"CK\" in the last 168 hours.    Diagnostics:             Physical Exam:    Physical Exam  Cardiovascular:      Rate and Rhythm: Normal rate and regular rhythm.      Comments: Right groin incision clean,dry,  intact   Pulmonary:      Effort: Pulmonary effort is normal.   Musculoskeletal:      Right lower leg: No edema.      Left lower leg: No edema.   Neurological:      Mental Status: He is alert and oriented to person, place, and time.     HEENT: No arcus senilis.  Neck: No JVD.  Heart: Quiet precordium.  Regular 1st and 2nd heart sounds.  Grade 2/6 systolic murmur, upper right sternal border.  Abdomen: Soft normal active bowel sounds.  Extremities: Equal and symmetric pulses x 4.  Right radial artery  with good pulse.  Right groin surgical incision with dressing that is clean and dry.  No bruit.  Neurologic: No focal motor deficits.    Medications:  Scheduled Medications[1]  Medication Infusions[2]    Assessment:  60yo presented with tenderness and swelling to right groin, s/p coronary angiogram on 5/23/25.  Radial artery anatomy unsuitable for catheterization, case was converted to a femoral approach angiogram.  Found on office follow-up visit yesterday to have pseudoaneurysm too large to consider thrombin injection for repair.    Right Femoral artery pseudoaneurysm-early POD #0 surgical repair of right femoral artery pseudoaneurysm.   S/p repair by Dr. Ashley   CAD  [facility] 5/23/25 with patent stent to RCA, 100% occluded LAD (chronic).  Medical mgmt for now, ongoing discussions down the line regarding possibly intervention of -LAD  Aspirin/plavix  No angina   Bicuspid Aortic Valve  peak velocity 2.6m/s, mild-mod AI, Aortic Root 4.1cm  HL  Lipitor 40mgqhs and Zetia      Plan:    Stable post pseudoanysm repair  Dc when ok with dr. Dion March, KARINA  5/30/2025  2:32 PM  Ph [phone] ([place])  Ph [phone] ([place])      Seen and examined.  Agree with exam and assessment as amended above.  Reviewed in detail with patient, his wife and 2 daughters.  Hopefully home tomorrow if uncomplicated recovery following pseudoaneurysm repair surgery and okay with Dr. Ashley.  Cardiac MDM reviewed in detail with BALTAZAR March.    ANTONIO Piña MD  3         [1]    aspirin  81 mg Oral Daily    clopidogrel  75 mg Oral Daily    famotidine  20 mg Oral BID    Or    famotidine  20 mg Intravenous BID    ceFAZolin  2 g Intravenous Q8H    atorvastatin  20 mg Oral Nightly    ezetimibe  10 mg Oral Nightly    ceFAZolin  2 g Intravenous Q8H   [2]    lactated ringers      sodium chloride

## 2025-05-30 NOTE — PLAN OF CARE
Patient A&Ox4   Room air  Pseudoaneurysm repair done today   Dressing CDI  Some bruising around groin area  No hematoma noted  Was able to sit up at 4pm  NSR on tele  Call light within reach, all safety measures maintained

## 2025-05-30 NOTE — PAYOR COMM NOTE
--------------  ADMISSION REVIEW     Payor: [facility]  Subscriber #:  [id number]  Authorization Number: [id number]    Admit date: 5/29/25 5/29:       Transfer Center Note:    DIRECT ADMISSION     Admitting MD: Dr. Dick  Called in by: Chery SELLERS at [facility] of Dr. Lamont Gandhi office  Call back #: RN cell phone # was provided  Date of admission: 5/29/25  Diagnosis: Pseudoaneurysm not able to be done o/p via thrombin injection will require sx intervention Dr. Ashley to be on consult.   Specialist MD:  Dr. Ashley  Hospitalist assigned: Dr. Dick  Type of admission: INPT  Type of bed: Cardiac Telemetry        Cardiology Consult:   Reason for Consultation:  Right groin pseudoaneurysm     History of Present Illness:  Luigi Mckinney is a a(n) 59 year old male who had routine follow up following [facility] yesterday in [facility] office. Was found to have tenderness/swelling to right groin and so US was done. US demonstrated pseudoaneurysm. Plan was for thrombin injection, however, after discussion with IR, this was unable to be done. Therefore, the PA discussed with Dr. Ashley who recommend admission and surgical repair.      Patient is currently resting in bed, in no acute distress. Denies cp, sob. Has right groin tenderness and ecchymosis.    Review of Systems:  Review of Systems   Skin:  Positive for color change.   All other systems reviewed and are negative.    Physical Exam:       Gen: alert, oriented x 3, NAD  Heent: pupils equal, reactive. Mucous membranes moist.   Neck: no jvd  Cardiac: regular rate and rhythm, normal S1,S2  Lungs: CTA  Abd: soft, NT/ND +bs  Ext: no edema. Right groin with ecchymosis, full, small palpable hematoma noted. Dp2+ bilaterally.  Skin: Warm, dry  Neuro: No focal deficits        Diagnostics History:     Echo: 2/11/25     1.The left ventricle is normal in size and global systolic function. Left ventricular diastolic function is normal. Left ventricular ejection fraction is 60-65%.  2.The aortic valve  appears functionally bicuspid with mild aortic stenosis. The trans-aortic peak velocity is 2.6 m/s. The trans-aortic mean gradient is 15.0 mmHg. Trace AI.  3.           Aortic root diameter is 4.1 cm.  Ascending aorta diameter is 4.0 cm.     Cath: 5/23/25  SUMMARY:  In summary, the patient is a 59-year-old gentleman who on cardiac catheterization today was demonstrated to have an occluded LAD with preserved overall left ventricular systolic function.  He has a bicuspid aortic valve with mild aortic stenosis and mild to moderate aortic insufficiency.  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     At this point, the patient will be treated medically.  I will review his films with my interventional cardiology colleagues, and we will make a decision regarding the possibility of approaching the LAD .  There is a very ambiguous proximal cap and it is a long occlusion.       LE US 5/28/25     1.           The study quality is good.   2.History of cardiac catherization, 5/23/2025 with right groin access.  3.           Pseudoaneurysm visualized in the right groin measuring 2.5 x 2.2 x 2.4 cm with a neck connection measuring 0.57 cm to the distal right common femoral  artery.  4.           No evidence of AV fistula in the right   groin.  5.           No evidence of deep venous thrombosis of the  right distal external iliac, common femoral, profunda femoral and proximal superficial femoral veins with normal venous blood flow patterns.  6.Dr. Fang notified.        Assessment:  Right groin pseudoaneurysm  US above. Dr. Ashley discussed with OP PA and IR unable to do thrombin injection, thus direct admitted for surgical repair.   CAD s/p [facility] 5/23  LAD occluded mid with L-L and R-L collaterals, Lcx ok, RCA previous stents patent  Plan for medical therapy vs future   Had abnormal Cardiac PET leading to diagnostic [facility]  CAD with prior MI/stenting to RCA  Bicuspid  AV with mild-mod AI  LVEF 60-65%  HL-on statin, zetia  HTN     Plan:  Labs stable.   Dr. Ashley to see. CTA abd/pelvis, LE ordered   Cont home cardiac regimen.           Vascular Surgery Consult:  HISTORY OF PRESENT ILLNESS: A 59-year-old white male who had undergone a cardiac cath, had a pseudoaneurysm found on duplex ultrasound by [facility]. He was sent home after the procedure to possibly have this treated with an interventional radiology duplex ultrasound thrombin injection. The patient's films were reviewed by IR and thought that the pseudoaneurysm was too large for this procedure. He has known history of coronary artery disease with prior coronary interventions. His most recent one prior to this was May 2023 at [facility] with left heart catheterization. The patient has a right femoral artery aneurysm. His chart was reviewed.     PHYSICAL EXAMINATION:  He has swelling in his right groin.     Ultrasound demonstrated a pseudoaneurysm, not amenable to a thrombin injection.       ASSESSMENT AND PLAN:  He is scheduled for a CT angiogram, and we are waiting for this.  He is scheduled for tomorrow morning to do a right femoral artery repair with correction of the pseudoaneurysm with the risks and complications explained.  We will keep him n.p.o. after midnight and wait for the CT angiogram and review in the morning with the patient.    FOLLOWUP CONSULTATION     HISTORY OF PRESENT ILLNESS:  Patient's chart was reviewed earlier with the previous consultation.  Patient was seen with his wife present at the bedside and has no complaints.  He underwent a cardiac cath the other day and developed a pseudoaneurysm, unfortunately, of the right groin.  He has a history of a PTCA coronary artery stent done by Dr. Gandhi about 5 years ago.  Denies diabetes.       PAST SURGICAL HISTORY:  The patient's previous procedure was just a PTCA of the coronary arteries.       MEDICATIONS:  His medications were noted from before of Zetia,  atorvastatin, aspirin.  I think he might have been on Plavix before.      ALLERGIES:  The patient has no known allergies.       SOCIAL HISTORY:  He is , with 2 children.  There is no ETOH abuse, drug abuse, or tobacco abuse.  The patient works as an .       FAMILY HISTORY:  Both parents have passed away, father he says self-inflicted.  Mother  from cancer.       REVIEW OF SYSTEMS:  He has no CVA, TIA, visual field defect, or aphasia.  No chest pain, shortness of breath.  No previous MI.  No gangrene, tissue loss, ulceration, or rest pain in lower legs.  He has no abdominal pain, no back pain, no nausea or vomiting, no hematemesis, no bright red blood per rectum.  No hematuria, dysuria, hemoptysis, cough.        PHYSICAL EXAMINATION:    GENERAL:  Currently, he is awake and alert.  He is appropriate.  He is in no acute distress.  He is resting comfortably, moving all 4 extremities.   VITAL SIGNS:  The patient's last blood pressure is 142/91, heart rate 60, respirations 17.  He is afebrile.  LUNGS:  Grossly normal.   HEART:  Grossly normal.  ABDOMEN:  Soft.  There is no tenderness.    EXTREMITIES:  There is some ecchymosis in the right groin.  There is swelling above the inguinal crease.  Pulses in the lower legs seem to be normal.  NEUROLOGIC:  He is intact.  He is moving all 4 extremities.  No swelling or tenderness in the joints.  He states he had a hip dislocation in the past that was reduced by himself.  I do not see any scars.     CT angiogram is pending.     IMPRESSION:  Patient with pseudoaneurysm of the right femoral artery, status post cardiac catheterization.  Recommend exploration of the right groin since he is not a candidate for duplex ultrasound-guided thrombin injection per IR.  The risks of death, cardiac complications, bleeding, infection, limb loss, wound healing, renal failure, multisystem organ failure were discussed.  Would also worry about the wound healing.  Hopefully, we  will be able to do a transverse incision above the inguinal crease.  Discussed the surgery and the hospital stay.        MEDICATIONS ADMINISTERED IN LAST 1 DAY:  atorvastatin (Lipitor) tab 20 mg       Date Action Dose Route User    5/29/2025 2104 Given 20 mg Oral Shantel Washburn RN          ceFAZolin (Ancef) 2g in 10mL IV syringe premix       Date Action Dose Route User    5/29/2025 1803 New Bag 2 g Intravenous Kevin Chery RN          ceFAZolin (Ancef) 2g in 10mL IV syringe premix       Date Action Dose Route User    5/30/2025 1041 Given 2 g Intravenous LouJory MD          dexamethasone (Decadron) 4 MG/ML injection       Date Action Dose Route User    5/30/2025 1041 Given 4 mg Intravenous Jory Blake MD          fentaNYL (Sublimaze) 50 mcg/mL injection       Date Action Dose Route User    5/30/2025 1056 Given 50 mcg Intravenous Jory Blake MD          iopamidol 76% (ISOVUE-370) injection for power injector       Date Action Dose Route User    5/29/2025 2046 Given 100 mL Intravenous Wing, Crystal          lidocaine PF (Xylocaine-MPF) 1% injection       Date Action Dose Route User    5/30/2025 1036 Given 100 mg Injection Jory Blake MD          melatonin tab 3 mg       Date Action Dose Route User    5/29/2025 2257 Given 3 mg Oral Shantel Washburn RN          midazolam (Versed) 2 MG/2ML injection       Date Action Dose Route User    5/30/2025 1034 Given 2 mg Intravenous Jory Blake MD          propofol (Diprivan) 10 MG/ML injection       Date Action Dose Route User    5/30/2025 1038 Given 20 mg Intravenous Jory Blake MD    5/30/2025 1036 Given 180 mg Intravenous LouJory ordaz MD          rocuronium (Zemuron) 50 mg/5mL injection       Date Action Dose Route User    5/30/2025 1037 Given 70 mg Intravenous Jory Blake MD          sodium chloride 0.9% infusion       Date Action Dose Route User    5/30/2025 1033 New Bag (none) Intravenous  Jory Blake MD            Vitals (last day)       Date/Time Temp Pulse Resp BP SpO2 Weight O2 Device O2 Flow Rate (L/min) [facility]    05/30/25 0800 97.9 °F (36.6 °C) 61 14 128/76 94 % -- None (Room air) --     05/30/25 0400 98.1 °F (36.7 °C) 60 16 122/78 96 % -- None (Room air) --     05/30/25 0000 97.8 °F (36.6 °C) 59 13 113/74 95 % -- None (Room air) --     05/29/25 2000 98.1 °F (36.7 °C) 59 13 124/78 98 % -- None (Room air) --     05/29/25 1546 98.1 °F (36.7 °C) -- -- -- -- -- None (Room air) --     05/29/25 1546 -- 65 17 142/91 95 % -- -- --     05/29/25 1512 -- -- -- -- -- 205 lb (93 kg) -- --

## 2025-05-30 NOTE — ANESTHESIA PROCEDURE NOTES
Airway  Date/Time: 5/30/2025 10:38 AM  Reason: elective    Airway not difficult    General Information and Staff   Patient location during procedure: OR  Anesthesiologist: Jory Blake MD  Performed: anesthesiologist   Performed by: Jory Blake MD  Authorized by: Jory Blake MD        Indications and Patient Condition  Indications for airway management: anesthesia  Sedation level: deep      Preoxygenated: yesPatient position: sniffing    Mask difficulty assessment: 1 - vent by mask    Final Airway Details    Final airway type: endotracheal airway    Successful airway: ETT  Cuffed: yes   Successful intubation technique: direct laryngoscopy  Endotracheal tube insertion site: oral  Blade: Erin  Blade size: #3  ETT size (mm): 8.0    Cormack-Lehane Classification: grade I - full view of glottis  Placement verified by: capnometry   Measured from: lips  ETT to lips (cm): 24  Number of attempts at approach: 1

## 2025-05-30 NOTE — CONSULTS
[facility]    PATIENT'S NAME: JUAN JOSE RAYGOZA   ATTENDING PHYSICIAN: Emery Treviño D.O.   CONSULTING PHYSICIAN: Varinder Ashley M.D.   PATIENT ACCOUNT#:   [id number]    LOCATION:  [address] [place] [address]  MEDICAL RECORD #:   [id number]       YOB: 1965  ADMISSION DATE:       2025      CONSULT DATE:  2025    REPORT OF CONSULTATION    FOLLOWUP CONSULTATION    HISTORY OF PRESENT ILLNESS:  Patient's chart was reviewed earlier with the previous consultation.  Patient was seen with his wife present at the bedside and has no complaints.  He underwent a cardiac cath the other day and developed a pseudoaneurysm, unfortunately, of the right groin.  He has a history of a PTCA coronary artery stent done by Dr. Gandhi about 5 years ago.  Denies diabetes.      PAST SURGICAL HISTORY:  The patient's previous procedure was just a PTCA of the coronary arteries.      MEDICATIONS:  His medications were noted from before of Zetia, atorvastatin, aspirin.  I think he might have been on Plavix before.     ALLERGIES:  The patient has no known allergies.      SOCIAL HISTORY:  He is , with 2 children.  There is no ETOH abuse, drug abuse, or tobacco abuse.  The patient works as an .      FAMILY HISTORY:  Both parents have passed away, father he says self-inflicted.  Mother  from cancer.      REVIEW OF SYSTEMS:  He has no CVA, TIA, visual field defect, or aphasia.  No chest pain, shortness of breath.  No previous MI.  No gangrene, tissue loss, ulceration, or rest pain in lower legs.  He has no abdominal pain, no back pain, no nausea or vomiting, no hematemesis, no bright red blood per rectum.  No hematuria, dysuria, hemoptysis, cough.       PHYSICAL EXAMINATION:    GENERAL:  Currently, he is awake and alert.  He is appropriate.  He is in no acute distress.  He is resting comfortably, moving all 4 extremities.   VITAL SIGNS:  The patient's last blood pressure is 142/91, heart rate 60,  respirations 17.  He is afebrile.  LUNGS:  Grossly normal.   HEART:  Grossly normal.  ABDOMEN:  Soft.  There is no tenderness.    EXTREMITIES:  There is some ecchymosis in the right groin.  There is swelling above the inguinal crease.  Pulses in the lower legs seem to be normal.  NEUROLOGIC:  He is intact.  He is moving all 4 extremities.  No swelling or tenderness in the joints.  He states he had a hip dislocation in the past that was reduced by himself.  I do not see any scars.    CT angiogram is pending.    IMPRESSION:  Patient with pseudoaneurysm of the right femoral artery, status post cardiac catheterization.  Recommend exploration of the right groin since he is not a candidate for duplex ultrasound-guided thrombin injection per IR.  The risks of death, cardiac complications, bleeding, infection, limb loss, wound healing, renal failure, multisystem organ failure were discussed.  Would also worry about the wound healing.  Hopefully, we will be able to do a transverse incision above the inguinal crease.  Discussed the surgery and the hospital stay.  I do not think he will be ready to go home tomorrow due to the time of the surgery, but if he is doing well, he will be discharged home on Friday with instructions on wound care and activity.  He is aware he has to allow this to heal.  All questions answered.    Dictated By Varinder Ashley M.D.  d: 05/29/2025 18:17:23  t: 05/29/2025 18:34:56  Job [id number]  JJW/

## 2025-05-31 VITALS
DIASTOLIC BLOOD PRESSURE: 73 MMHG | HEART RATE: 66 BPM | RESPIRATION RATE: 20 BRPM | WEIGHT: 205 LBS | BODY MASS INDEX: 30 KG/M2 | SYSTOLIC BLOOD PRESSURE: 134 MMHG | OXYGEN SATURATION: 95 % | TEMPERATURE: 99 F

## 2025-05-31 LAB
ANION GAP SERPL CALC-SCNC: 9 MMOL/L (ref 0–18)
BUN BLD-MCNC: 13 MG/DL (ref 9–23)
CALCIUM BLD-MCNC: 9 MG/DL (ref 8.7–10.6)
CHLORIDE SERPL-SCNC: 104 MMOL/L (ref 98–112)
CO2 SERPL-SCNC: 28 MMOL/L (ref 21–32)
CREAT BLD-MCNC: 1.23 MG/DL (ref 0.7–1.3)
EGFRCR SERPLBLD CKD-EPI 2021: 68 ML/MIN/1.73M2 (ref 60–?)
ERYTHROCYTE [DISTWIDTH] IN BLOOD BY AUTOMATED COUNT: 12.7 %
GLUCOSE BLD-MCNC: 115 MG/DL (ref 70–99)
HCT VFR BLD AUTO: 33.3 % (ref 39–53)
HGB BLD-MCNC: 11.4 G/DL (ref 13–17.5)
MCH RBC QN AUTO: 31 PG (ref 26–34)
MCHC RBC AUTO-ENTMCNC: 34.2 G/DL (ref 31–37)
MCV RBC AUTO: 90.5 FL (ref 80–100)
OSMOLALITY SERPL CALC.SUM OF ELEC: 293 MOSM/KG (ref 275–295)
P2Y12 REACTION UNITS: 211 PRU
PLATELET # BLD AUTO: 231 10(3)UL (ref 150–450)
POTASSIUM SERPL-SCNC: 3.8 MMOL/L (ref 3.5–5.1)
RBC # BLD AUTO: 3.68 X10(6)UL (ref 4.3–5.7)
SODIUM SERPL-SCNC: 141 MMOL/L (ref 136–145)
WBC # BLD AUTO: 9.9 X10(3) UL (ref 4–11)

## 2025-05-31 PROCEDURE — 99239 HOSP IP/OBS DSCHRG MGMT >30: CPT | Performed by: HOSPITALIST

## 2025-05-31 RX ORDER — POTASSIUM CHLORIDE 1500 MG/1
40 TABLET, EXTENDED RELEASE ORAL ONCE
Status: COMPLETED | OUTPATIENT
Start: 2025-05-31 | End: 2025-05-31

## 2025-05-31 NOTE — DISCHARGE INSTRUCTIONS
No exercise/ lifting more than 3 pounds 6 weeks. No driving one week. No shower one week. No constricting garments/ underwear on incision. Keep dry gauze on wound at all times. Can paint incision with betadine q day. Call office for temperature> 100.5/ wound drainage.

## 2025-05-31 NOTE — PLAN OF CARE
Assumed care at 1930   A&Ox4, RA, NSR   R groin dressing C/D/I   No changes to R groin hematoma   Tramadol given for pain   Up ad martina to bathroom   LR at 125/hr  Call light in reach, questions answered, needs met

## 2025-05-31 NOTE — PLAN OF CARE
Assumed care at 0730  A&Ox4, VSS, up with standby assist   Abrahan given for pain   Tolerating diet   R groin site intact- dressing changed   Medications, prescriptions and AVS reviewed with patient and family- verbalized understanding   All questions answered       NURSING DISCHARGE NOTE    Discharged Home via Wheelchair.  Accompanied by Support staff  Belongings Taken by patient/family.

## 2025-05-31 NOTE — DISCHARGE SUMMARY
[facility]     Luigi Mckinney Patient Status:  Inpatient    1965 MRN [id number]   Location [facility] 7NE-A Attending No att. 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 Patient reports increased swelling and bruising of right groin since procedure . Denies pain. Denies CP/SOB/N/V.     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   -S/p exploration of right groin with exposure of the right external iliac artery and superficial femoral artery, and primary repair of the pseudoaneurysm with debridement and removal of the mural thrombus and aneurysm wall   -Vascular surgery and cardiology following     #CAD with prior PCI  #Recent abnormal stress test  -[facility] report  reviewed - no intervention performed at that time  -Continue statin  -Resume ASA at DC     #DL  -Statin/zetia    #Disposition  -Stable for DC home    Lace+ Score: 34  59-90 High Risk  29-58 Medium Risk  0-28   Low Risk       TCM Follow-Up Recommendation:  LACE 29-58: Moderate Risk of readmission after discharge from the hospital.      Procedures during hospitalization:   Exploration of right groin with exposure of the right external iliac artery and superficial femoral artery, and primary repair of the pseudoaneurysm with debridement and removal of the mural thrombus and aneurysm wall     Incidental or significant findings and recommendations (brief  descriptions):  None    Lab/Test results pending at Discharge:   None    Consultants:  Vascular surgery  Cardiology     Discharge Medication List:     Discharge Medications        START taking these medications        Instructions Prescription details   HYDROcodone-acetaminophen 5-325 MG Tabs  Commonly known as: Norco      Take 1-2 tablets by mouth every 4 (four) hours as needed for Pain.   Quantity: 40 tablet  Refills: 0     Naloxone HCl 4 MG/0.1ML Liqd      4 mg by Nasal route as needed. If patient remains unresponsive, repeat dose in other nostril 2-5 minutes after first dose.   Quantity: 1 kit  Refills: 0            CONTINUE taking these medications        Instructions Prescription details   aspirin 81 MG Tbec      Take 1 tablet (81 mg total) by mouth daily.   Refills: 0     atorvastatin 20 MG Tabs  Commonly known as: Lipitor      Take 1 tablet (20 mg total) by mouth nightly.   Refills: 0     ezetimibe 10 MG Tabs  Commonly known as: Zetia      Take 1 tablet (10 mg total) by mouth nightly.   Refills: 0               Where to Get Your Medications        These medications were sent to [id number] - [place], [place] - [address] AT [facility], [phone], [phone]  [address], [facility] [address]      Hours: 24-hours Phone: [phone]   HYDROcodone-acetaminophen 5-325 MG Tabs  Naloxone HCl 4 MG/0.1ML Liqd         [place]    Follow-up appointment:   Varinder Ashley MD   [facility] [address]  [facility] [address]  [phone]    Follow up in 1 week(s)  For wound re-check    Lamont Gandhi MD  [address] [place] [address] [place]  [facility] [address]  [facility] [address]  [phone]    Schedule an appointment as soon as possible for a visit in 2 week(s)      Demarcus Bey  [address] [facility] [id number] [phone]    Schedule an appointment as soon as possible for a visit in 1 week(s)      Appointments for Next 30 Days 5/31/2025 - 6/30/2025      None            Vital  signs:  Temp:  [97.6 °F (36.4 °C)-98.7 °F (37.1 °C)] 98.7 °F (37.1 °C)  Pulse:  [66-92] 66  Resp:  [10-20] 20  BP: (109-137)/(66-83) 134/73  SpO2:  [94 %-96 %] 95 %    Physical Exam:    General: No acute distress   Lungs: clear to auscultation  Cardiovascular: S1, S2  Abdomen: Soft  Ext: Right groin with dressing in place, ecchymosis improved     -----------------------------------------------------------------------------------------------  PATIENT DISCHARGE INSTRUCTIONS: See electronic chart    Emery Treviño DO    Total time spent on discharge plannin minutes     The  [employer] Cures Act makes medical notes like these available to patients in the interest of transparency. Please be advised this is a medical document. Medical documents are intended to carry relevant information, facts as evident, and the clinical opinion of the practitioner. The medical note is intended as peer to peer communication and may appear blunt or direct. It is written in medical language and may contain abbreviations or verbiage that are unfamiliar.

## 2025-06-01 LAB
BLOOD TYPE BARCODE: 9500
UNIT VOLUME: 350 ML

## 2025-06-01 NOTE — DISCHARGE SUMMARY
[facility]    PATIENT'S NAME: JUAN JOSE RAYGOZA   ATTENDING PHYSICIAN: Emery Treviño D.O.   PATIENT ACCOUNT#:   [id number]    LOCATION:  [address] [place] [address]  MEDICAL RECORD #:   [id number]       YOB: 1965  ADMISSION DATE:       05/29/2025      DISCHARGE DATE:  05/31/2025    DISCHARGE SUMMARY    HISTORY AND HOSPITAL COURSE:  Patient is overall doing well.  He was walking earlier this morning.  His groin wound is clean and dry.  His pulses are palpable.  Neurologically, he is intact.  Discussed the surgery with him, aware of the extent of the operation.    DISCHARGE INSTRUCTIONS:  Stressed the importance of no heavy activity or exertion for at least 4 to 6 weeks.  No driving for at least 1 week.  No showering for at least 1 week.  Call me if there is any fever or drainage.  I told him he is to have gauze covering the wound and no underwear or any constricting garments over the incision.  Pain medications have already been ordered through [facility].  All questions had been answered for the patient.  Discharge with present medications if okay with Cardiology and hospitalist.    Dictated By Varinder Ashley M.D.  d: 05/31/2025 09:16:58  t: 05/31/2025 10:34:41  Job [id number]  ARTURO/

## 2025-06-02 LAB — ISTAT ACTIVATED CLOTTING TIME: 141 SECONDS (ref 74–137)

## 2025-06-02 NOTE — PAYOR COMM NOTE
--------------  DISCHARGE REVIEW    Payor: [facility]  Subscriber #:  [id number]  Authorization Number: [id number]    Admit date: 5/29/25  Admit time:   2:03 PM  Discharge Date: 5/31/2025 10:46 AM     Admitting Physician: Emory Dick MD  Attending Physician:  No att. providers found  Primary Care Physician: Demarcus Bey          Discharge Summary Notes        Discharge Summary filed by Varinder Ashley MD at 6/1/2025  8:42 AM / Draft: Not Electronically Signed       Author: Varinder Ashley MD Specialty: SURGERY, VASCULAR, SURGERY, CARDIOVASCULAR, Surgery, Thoracic Author Type: Physician    Filed: 6/1/2025  8:42 AM Status: Unsigned Transcription    : Varinder Ashley MD (Physician)           DISCHARGE SUMMARY    JUAN JOSE RAYGOZA [id number]   YOB: 1965 [id number]         [facility]    PATIENT'S NAME: JUAN JOSE RAYGOZA   ATTENDING PHYSICIAN: Emery Treviño D.O.   PATIENT ACCOUNT#:   [id number]    LOCATION:  [address] [place] [address]  MEDICAL RECORD #:   [id number]       YOB: 1965  ADMISSION DATE:       05/29/2025      DISCHARGE DATE:  05/31/2025    DISCHARGE SUMMARY    HISTORY AND HOSPITAL COURSE:  Patient is overall doing well.  He was walking earlier this morning.  His groin wound is clean and dry.  His pulses are palpable.  Neurologically, he is intact.  Discussed the surgery with him, aware of the extent of the operation.    DISCHARGE INSTRUCTIONS:  Stressed the importance of no heavy activity or exertion for at least 4 to 6 weeks.  No driving for at least 1 week.  No showering for at least 1 week.  Call me if there is any fever or drainage.  I told him he is to have gauze covering the wound and no underwear or any constricting garments over the incision.  Pain medications have already been ordered through [facility].  All questions had been answered for the patient.  Discharge with present medications if okay with Cardiology and hospitalist.    Dictated By Varinder Ashley  M.D.  d: 05/31/2025 09:16:58  t: 05/31/2025 10:34:41  Job [id number]  JJSKYLER/           REVIEWER COMMENTS  DC disposition: Home

## (undated) DEVICE — ZZ-CONVERTED-TO-524825-ADHESIVE SKIN TOP FOR WND CLSR DERMBND ADV

## (undated) DEVICE — GAUZE,SPONGE,4"X4",16PLY,XRAY,STRL,LF: Brand: MEDLINE

## (undated) DEVICE — ANTIBACTERIAL UNDYED BRAIDED (POLYGLACTIN 910), SYNTHETIC ABSORBABLE SUTURE: Brand: COATED VICRYL

## (undated) DEVICE — CLIP LIG M BLU TI HRT SHP WRE HORZ 600 PER BX

## (undated) DEVICE — SOLUTION IV 500ML 0.9% NACL FLX CONT

## (undated) DEVICE — SUT VCRL 2-0 36IN CT-1 ABSRB UD L36MM 1/2 CIR

## (undated) DEVICE — Device: Brand: INTELLICART™

## (undated) DEVICE — SOLUTION IRRIG 1000ML 0.9% NACL USP BTL

## (undated) DEVICE — TRAY CATH 16FR F INCL BARDX IC COMPLT CARE

## (undated) DEVICE — STANDARD HYPODERMIC NEEDLE,POLYPROPYLENE HUB: Brand: MONOJECT

## (undated) DEVICE — SUT ETHBND XL 3-0 30IN SH NABSRB GRN 26MM 1/2

## (undated) DEVICE — Device

## (undated) DEVICE — KENDALL SCD EXPRESS SLEEVES, KNEE LENGTH, LARGE: Brand: KENDALL SCD

## (undated) DEVICE — ZZ-CONVERTED-TO-522442- SPONGE 4X4 10PK

## (undated) DEVICE — STERILE POLYISOPRENE POWDER-FREE SURGICAL GLOVES: Brand: PROTEXIS

## (undated) DEVICE — PACK CV CUSTOM

## (undated) DEVICE — SUT DEKNATEL POLYDEK 4-0 12XL30IN GRN POLY

## (undated) DEVICE — SUT PROL 7-0 24IN BV-1 NABSRB BLU 9.3MM 3/8 C

## (undated) DEVICE — SPONGE: SPECIALTY PEANUT XR 100/CS: Brand: MEDICAL ACTION INDUSTRIES

## (undated) DEVICE — 3M™ IOBAN™ 2 ANTIMICROBIAL INCISE DRAPE 6651EZ: Brand: IOBAN™ 2

## (undated) DEVICE — SUT PROL 4-0 36IN SH NABSRB BLU 26MM 1/2 CIR

## (undated) DEVICE — SUT PROL 6-0 24IN C-1 NABSRB BLU 13MM 3/8 CIR

## (undated) DEVICE — GEL US 20 GM PKT ST AQSNC 100

## (undated) DEVICE — HEMOSTAT KIT SURGIFLO THROM 8ML

## (undated) DEVICE — 3M™ BAIR HUGGER® UNDERBODY BLANKET, FULL ACCESS, 10 PER CASE 63500: Brand: BAIR HUGGER™

## (undated) NOTE — LETTER
[address]  [address]  Authorization for Surgical Operation and Procedure     Date:___________                                                                                                         Time:__________  I hereby authorize Surgeon(s):  Varinder Ashley MD, my physician and his/her assistants (if applicable), which may include medical students, residents, and/or fellows, to perform the following surgical operation/ procedure and administer such anesthesia as may be determined necessary by my physician:  Operation/Procedure name (s) Procedure(s):  RIGHT FEMORAL ARTERY PSEUDOANEURYSM REPAIR on Luigi V Hank   2.   I recognize that during the surgical operation/procedure, unforeseen conditions may necessitate additional or different procedures than those listed above.  I, therefore, further authorize and request that the above-named surgeon, assistants, or designees perform such procedures as are, in their judgment, necessary and desirable.    3.   My surgeon/physician has discussed prior to my surgery the potential benefits, risks and side effects of this procedure; the likelihood of achieving goals; and potential problems that might occur during recuperation.  They also discussed reasonable alternatives to the procedure, including risks, benefits, and side effects related to the alternatives and risks related to not receiving this procedure.  I have had all my questions answered and I acknowledge that no guarantee has been made as to the result that may be obtained.    4.   Should the need arise during my operation/procedure, which includes change of level of care prior to discharge, I also consent to the administration of blood and/or blood products.  Further, I understand that despite careful testing and screening of blood or blood products by collecting agencies, I may still be subject to ill effects as a result of receiving a blood transfusion and/or blood  products.  The following are some, but not all, of the potential risks that can occur: fever and allergic reactions, hemolytic reactions, transmission of diseases such as Hepatitis, AIDS and Cytomegalovirus (CMV) and fluid overload.  In the event that I wish to have an autologous transfusion of my own blood, or a directed donor transfusion, I will discuss this with my physician.  Check only if Refusing Blood or Blood Products  I understand refusal of blood or blood products as deemed necessary by my physician may have serious consequences to my condition to include possible death. I hereby assume responsibility for my refusal and release the hospital, its personnel, and my physicians from any responsibility for the consequences of my refusal.          o  Refuse      5.   I authorize the use of any specimen, organs, tissues, body parts or foreign objects that may be removed from my body during the operation/procedure for diagnosis, research or teaching purposes and their subsequent disposal by hospital authorities.  I also authorize the release of specimen test results and/or written reports to my treating physician on the hospital medical staff or other referring or consulting physicians involved in my care, at the discretion of the Pathologist or my treating physician.    6.   I consent to the photographing or videotaping of the operations or procedures to be performed, including appropriate portions of my body for medical, scientific, or educational purposes, provided my identity is not revealed by the pictures or by descriptive texts accompanying them.  If the procedure has been photographed/videotaped, the surgeon will obtain the original picture, image, videotape or CD.  The hospital will not be responsible for storage, release or maintenance of the picture, image, tape or CD.    7.   I consent to the presence of a  or observers in the operating room as deemed necessary by my physician or  their designees.    8.   I recognize that in the event my procedure results in extended X-Ray/fluoroscopy time, I may develop a skin reaction.    9. If I have a Do Not Attempt Resuscitation (DNAR) order in place, that status will be suspended while in the operating room, procedural suite, and during the recovery period unless otherwise explicitly stated by me (or a person authorized to consent on my behalf). The surgeon or my attending physician will determine when the applicable recovery period ends for purposes of reinstating the DNAR order.  10. Patients having a sterilization procedure: I understand that if the procedure is successful the results will be permanent and it will therefore be impossible for me to inseminate, conceive, or bear children.  I also understand that the procedure is intended to result in sterility, although the result has not been guaranteed.   11. I acknowledge that my physician has explained sedation/analgesia administration to me including the risk and benefits I consent to the administration of sedation/analgesia as may be necessary or desirable in the judgment of my physician.    I CERTIFY THAT I HAVE READ AND FULLY UNDERSTAND THE ABOVE CONSENT TO OPERATION and/or OTHER PROCEDURE.    _________________________________________  __________________________________  Signature of Patient     Signature of Responsible Person         ___________________________________         Printed Name of Responsible Person           _________________________________                 Relationship to Patient  _________________________________________  ______________________________  Signature of Witness          Date  Time      Patient Name: Luigi Mckinney     : 1965                 Printed: May 29, 2025     Medical Record #: [id number]                     Page 1 of [address] [place] [address]  [id number]    Consent for Anesthesia    Luigi HOLLINS  Hank agree to be cared for by an anesthesiologist, who is specially trained to monitor me and give me medicine to put me to sleep or keep me comfortable during my procedure    I understand that my anesthesiologist is not an employee or agent of [facility] [employer]. He or she works for [employer].    As the patient asking for anesthesia services, I agree to:  Allow the anesthesiologist (anesthesia doctor) to give me medicine and do additional procedures as necessary. Some examples are: Starting or using an “IV” to give me medicine, fluids or blood during my procedure, and having a breathing tube placed to help me breathe when I’m asleep (intubation). In the event that my heart stops working properly, I understand that my anesthesiologist will make every effort to sustain my life, unless otherwise directed by [facility] Do Not Resuscitate documents.  Tell my anesthesia doctor before my procedure:  If I am pregnant.  The last time that I ate or drank.  All of the medicines I take (including prescriptions, herbal supplements, and pills I can buy without a prescription (including street drugs/illegal medications). Failure to inform my anesthesiologist about these medicines may increase my risk of anesthetic complications.  If I am allergic to anything or have had a reaction to anesthesia before.  I understand how the anesthesia medicine will help me (benefits).  I understand that with any type of anesthesia medicine there are risks:  The most common risks are: nausea, vomiting, sore throat, muscle soreness, damage to my eyes, mouth, or teeth (from breathing tube placement).  Rare risks include: remembering what happened during my procedure, allergic reactions to medications, injury to my airway, heart, lungs, vision, nerves, or muscles and in extremely rare instances death.  My doctor has explained to me other choices available to me for my care (alternatives).  Pregnant  Patients (“epidural”):  I understand that the risks of having an epidural (medicine given into my back to help control pain during labor), include itching, low blood pressure, difficulty urinating, headache or slowing of the baby’s heart. Very rare risks include infection, bleeding, seizure, irregular heart rhythms and nerve injury.  Regional Anesthesia (“spinal”, “epidural”, & “nerve blocks”):  I understand that rare but potential complications include headache, bleeding, infection, seizure, irregular heart rhythms, and nerve injury.    I can change my mind about having anesthesia services at any time before I get the medicine.    _____________________________________________________________________________  Patient (or Representative) Signature/Relationship to Patient  Date   Time    _____________________________________________________________________________   Name (if used)    Language/Organization   Time    _____________________________________________________________________________  Anesthesiologist Signature     Date   Time  I have discussed the procedure and information above with the patient (or patient’s representative) and answered their questions. The patient or their representative has agreed to have anesthesia services.    _____________________________________________________________________________  Witness        Date   Time  I have verified that the signature is that of the patient or patient’s representative, and that it was signed before the procedure  Patient Name: Luigi Mckinney     : 1965                 Printed: May 29, 2025     Medical Record #: [id number]                     Page 2 of 2